# Patient Record
Sex: MALE | Race: WHITE | NOT HISPANIC OR LATINO | Employment: FULL TIME | URBAN - METROPOLITAN AREA
[De-identification: names, ages, dates, MRNs, and addresses within clinical notes are randomized per-mention and may not be internally consistent; named-entity substitution may affect disease eponyms.]

---

## 2020-10-21 ENCOUNTER — ANCILLARY PROCEDURE (OUTPATIENT)
Dept: NEUROLOGY | Facility: CLINIC | Age: 30
End: 2020-10-21
Attending: PSYCHIATRY & NEUROLOGY
Payer: COMMERCIAL

## 2020-10-21 VITALS
DIASTOLIC BLOOD PRESSURE: 82 MMHG | SYSTOLIC BLOOD PRESSURE: 141 MMHG | WEIGHT: 257.8 LBS | TEMPERATURE: 97.7 F | HEART RATE: 81 BPM | HEIGHT: 73 IN | BODY MASS INDEX: 34.17 KG/M2

## 2020-10-21 DIAGNOSIS — R56.9 SEIZURE (H): ICD-10-CM

## 2020-10-21 DIAGNOSIS — G40.209 COMPLEX PARTIAL SEIZURE EVOLVING TO GENERALIZED SEIZURE (H): Primary | ICD-10-CM

## 2020-10-21 RX ORDER — LAMOTRIGINE 100 MG/1
TABLET ORAL
COMMUNITY
Start: 2010-03-01 | End: 2020-10-21

## 2020-10-21 RX ORDER — FAMOTIDINE 10 MG
TABLET ORAL PRN
COMMUNITY
Start: 2018-06-01

## 2020-10-21 RX ORDER — FINASTERIDE 1 MG/1
TABLET, FILM COATED ORAL
COMMUNITY
Start: 2017-01-01

## 2020-10-21 RX ORDER — LAMOTRIGINE 100 MG/1
200 TABLET ORAL 2 TIMES DAILY
Qty: 360 TABLET | Refills: 3 | Status: SHIPPED | OUTPATIENT
Start: 2020-10-21 | End: 2021-01-12

## 2020-10-21 ASSESSMENT — MIFFLIN-ST. JEOR: SCORE: 2182.21

## 2020-10-21 NOTE — LETTER
10/21/2020       RE: Donnell Staples  : 1990   MRN: [id number]      Dear Colleague,    Thank you for referring your patient, Donnell Staples, to the [facility] EPILEPSY CARE at [facility]. Please see a copy of my visit note below.      [facility]  Epilepsy Clinic:  NEW PATIENT EVALUATION         Service Date: 10/21/2020    HISTORY:  Mr. 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  He has recently moved from [place], where was followed at [place] by Dr. Fer Butcher (who retired) and later by Dr. Kincaid for epilepsy care.     Ictal semiology-history:   He had a single grand mal seizure at clinical epilepsy onset.  His seizures started in  when in college. He was under a lot of stress and had poor sleep. While he was falling asleep he had a seizure. His brother found him unresponsive and jerking, with his bedding a mess and he was taken to the ER.    His seizures are characterized by right pinky shaking which he is able to feel before loss of consciousness. Then the shaking spreads through his whole body. He typically is lying in bed because they happen while he is trying to fall asleep. While he is just about to fall asleep. He had one while at his parents  house while standing and working at a computer. He has been tired and stressed prior. He became cyanotic. EMS was called. Seizures typically last 2-3 minutes (as an estimate.) His most recent seizure was on 02/10/2010; he has had a total of 10-12 seizures of this type. Many are unwitnessed while in bed.     He denies any aura symptoms like tastes, sounds, vision changes, smells. No association with alcohol. He reports the seizures will happen after a day when he has been able to sleep in a lot.     The patient does not recognize exacerbating or remitting  factors with regard to seizure frequency.      Epilepsy-seizure predispositions:   The patient has no family history of epilepsy or seizures.    He has no history of gestational or  injury, febrile convulsions, developmental delay, stroke, meningitis, encephalitis, significant head injury, or other epileptic predispositions than the report cerebral malformation.      Laboratory evaluations:   EEG on 2009 revealed occasional L frontotemporal slow activity with  occasional L temporal sharps.     Reports last MRI , has small area of cortical malformation, we reviewed this on his phone      Epilepsy therapeutics:   Keppra made him feel sleepy and mentally foggy.   Zonegran possibly had mild side effects, but mainly didn't work well for seizure control.  Lamotrigine has been well tolerated and effective.  He has been maintained on lamotrigine 200 mg BID since . He switched to generic  due to insurance coverage, with no breakthrough seizures and no side effects.  He had a lamotrigine level of7.5mcg (2.0-20.0mcg) on 01/10/2019.      PAST MEDICAL-SURGICAL HISTORY:    1. Lesional partial epilepsy.  2. History of left hemispheric malformation.  3. Status post Meniscus surgery.    FAMILY HISTORY:  There is no family history of seizures or epilepsy, or of other neurological conditions.      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  He lives with his wife in [place], [place].  He drinks alcohol rarely, denies tobacco use, denies recreational drugs.     REVIEW OF SYSTEMS:  The patient denied history of attention and memory disturbance, difficulties with comprehension and expression, difficulty in solving problems, weakness, tremors or other abnormal involuntary movements, numbness or tingling, incoordination, falling or difficulty in walking, urinary or fecal incontinence, headache and other pain, except as described above.  The patient denied any  "history of severe depression or suicidal ideation, severe anxiety, panic attacks, hallucinations, delusions, psychosis, substance abuse, or psychiatric hospitalization.  He denied rashes and easy bruising.  The remainder of a 14-system symptom review was negative.    MEDICATIONS:    Lamotrigine 200 mg b.i.d., and other medications as per the electronic medical record.     PHYSICAL EXAMINATION:    BP (!) 141/82 (BP Location: Left arm, Patient Position: Sitting, Cuff Size: Adult Large)   Pulse 81   Temp 97.7  F (36.5  C) (Temporal)   Ht 6' 1.25\" (186.1 cm)   Wt 257 lb 12.8 oz (116.9 kg)   BMI 33.78 kg/m    General: Adult, in NAD, cooperative  HEENT: NC/AT, no icterus, op pink and moist  Cardiac: RRR no M  Chest: CTAB no w/c/r  Skin: No rash or lesion.   Psych: normal mood and affect  Neuro:  Mental status: Awake, alert, attentive, oriented. Speech is fluent, comprehension and repetition intact. No dysarthria.  Cranial nerves: Eyes conjugate, EOMI, face symmetric, shoulder shrug strong, palate rise symmetric, tongue/uvula midline, hearing intact to conversation.  Motor: Tone normal. 5/5 strength in all 4 extremities. No atrophy or twitches. No pronator drift. Finger tapping symmetric.   Coordination: FNF no dysmetria, mildly reduced coordination on finger taping on the right compared to left  Gait: Normal width, stride length, turn, and arm swing. Station normal. Tandem walk intact.    IMPRESSION:    Donnell Staples is a 30 year old male who presents right simple partial motor seizures with secondary generalization due to left sided cortical abnormality, likely congenital. He is completely seizure free for 10 years on lamotrigine 200 mg BID. He prefers the generic by company named [employer] (takes 100 mg tabs). His seizures primarily happen while falling asleep and are precipitated by stress and sleep deprivation. We would like to repeat an MRI with 3T to make sure this cortical abnormality is stable and better assess " what it could be. It does not appear as deep or as wide spread as classic schizencephaly. EEG today is improved from report with no sharps and possible slow wave activity which could be related to medication. Lamotrigine level 7.5 1/2019.     We spent a considerable time reviewing the nature of malformations of cortical development, and reasons to re-evaluate this diagnosis to offer better prognostication and to exclude other types of lesions.  He did want to proceed with this higher resolution MRI study.    We also reviewed complications of hypertension and of obesity, and reasons that he should establish care with a primary care provider who will track his BP and weight, with recommendations on antihypertensive therapy as indicated and on weight loss.  He agreed with this.    He has not had a seizure with impaired awareness for many years, and has been driving under [place] regulations.  We specifically reviewed [place] regulations on seizures and driving with the patient.  He appeared to clearly understand that he is prohibited from operating a motor vehicle within 3 months following any seizure or other episode with sudden unconsciousness or inability to sit up, and that he is required to report any future such seizure to the [facility] within 30 days after the event.      PLAN:    1) Continue the current dose of lamotrigine  2) Schedule brain 3T [place] MRI.  3) Return visit in about 3 months.   Patient was seen and discussed with Dr. Negrete who agrees with this assessment and plan.   Tricia Hernandez DO  Neurology PGY-3      Report Prepared By: Tricia Heranndez DO, Neurology Resident   I agree with the findings and plan of care as documented.    I discussed our epilepsy diagnostic impressions and therapeutic recommendations with the patient. He was agreeable to this plan.    I spent 55 minutes in this patient care, more than 50% of which consisted of counseling and coordinating care.      Kevan Negrete M.D.    Professor of Neurology         Again, thank you for allowing me to participate in the care of your patient.      Sincerely,    Kevan Negrete MD

## 2020-10-21 NOTE — LETTER
Date:November 4, 2020      Patient was self referred, no letter generated. Do not send.        [facility] Health Information

## 2020-10-21 NOTE — PROGRESS NOTES
[facility]  Epilepsy Clinic:  NEW PATIENT EVALUATION         Service Date: 10/21/2020    HISTORY:  Mr. 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  He has recently moved from [place], where was followed at [place] by Dr. eFr Butcher (who retired) and later by Dr. Kincaid for epilepsy care.     Ictal semiology-history:   He had a single grand mal seizure at clinical epilepsy onset.  His seizures started in  when in college. He was under a lot of stress and had poor sleep. While he was falling asleep he had a seizure. His brother found him unresponsive and jerking, with his bedding a mess and he was taken to the ER.    His seizures are characterized by right pinky shaking which he is able to feel before loss of consciousness. Then the shaking spreads through his whole body. He typically is lying in bed because they happen while he is trying to fall asleep. While he is just about to fall asleep. He had one while at his parents  house while standing and working at a computer. He has been tired and stressed prior. He became cyanotic. EMS was called. Seizures typically last 2-3 minutes (as an estimate.) His most recent seizure was on 02/10/2010; he has had a total of 10-12 seizures of this type. Many are unwitnessed while in bed.     He denies any aura symptoms like tastes, sounds, vision changes, smells. No association with alcohol. He reports the seizures will happen after a day when he has been able to sleep in a lot.     The patient does not recognize exacerbating or remitting factors with regard to seizure frequency.      Epilepsy-seizure predispositions:   The patient has no family history of epilepsy or seizures.    He has no history of gestational or  injury, febrile convulsions, developmental delay, stroke, meningitis,  encephalitis, significant head injury, or other epileptic predispositions than the report cerebral malformation.      Laboratory evaluations:   EEG on 5/6/2009 revealed occasional L frontotemporal slow activity with  occasional L temporal sharps.     Reports last MRI 2010, has small area of cortical malformation, we reviewed this on his phone      Epilepsy therapeutics:   Keppra made him feel sleepy and mentally foggy.   Zonegran possibly had mild side effects, but mainly didn't work well for seizure control.  Lamotrigine has been well tolerated and effective.  He has been maintained on lamotrigine 200 mg BID since 2010. He switched to generic 2019 due to insurance coverage, with no breakthrough seizures and no side effects.  He had a lamotrigine level of7.5mcg (2.0-20.0mcg) on 01/10/2019.      PAST MEDICAL-SURGICAL HISTORY:    1. Lesional partial epilepsy.  2. History of left hemispheric malformation.  3. Status post Meniscus surgery.    FAMILY HISTORY:  There is no family history of seizures or epilepsy, or of other neurological conditions.      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  He lives with his wife in [place], [place].  He drinks alcohol rarely, denies tobacco use, denies recreational drugs.     REVIEW OF SYSTEMS:  The patient denied history of attention and memory disturbance, difficulties with comprehension and expression, difficulty in solving problems, weakness, tremors or other abnormal involuntary movements, numbness or tingling, incoordination, falling or difficulty in walking, urinary or fecal incontinence, headache and other pain, except as described above.  The patient denied any history of severe depression or suicidal ideation, severe anxiety, panic attacks, hallucinations, delusions, psychosis, substance abuse, or psychiatric hospitalization.  He denied rashes and easy bruising.  The remainder of a 14-system symptom review was  "negative.    MEDICATIONS:    Lamotrigine 200 mg b.i.d., and other medications as per the electronic medical record.     PHYSICAL EXAMINATION:    BP (!) 141/82 (BP Location: Left arm, Patient Position: Sitting, Cuff Size: Adult Large)   Pulse 81   Temp 97.7  F (36.5  C) (Temporal)   Ht 6' 1.25\" (186.1 cm)   Wt 257 lb 12.8 oz (116.9 kg)   BMI 33.78 kg/m    General: Adult, in NAD, cooperative  HEENT: NC/AT, no icterus, op pink and moist  Cardiac: RRR no M  Chest: CTAB no w/c/r  Skin: No rash or lesion.   Psych: normal mood and affect  Neuro:  Mental status: Awake, alert, attentive, oriented. Speech is fluent, comprehension and repetition intact. No dysarthria.  Cranial nerves: Eyes conjugate, EOMI, face symmetric, shoulder shrug strong, palate rise symmetric, tongue/uvula midline, hearing intact to conversation.  Motor: Tone normal. 5/5 strength in all 4 extremities. No atrophy or twitches. No pronator drift. Finger tapping symmetric.   Coordination: FNF no dysmetria, mildly reduced coordination on finger taping on the right compared to left  Gait: Normal width, stride length, turn, and arm swing. Station normal. Tandem walk intact.    IMPRESSION:    Donnell Staples is a 30 year old male who presents right simple partial motor seizures with secondary generalization due to left sided cortical abnormality, likely congenital. He is completely seizure free for 10 years on lamotrigine 200 mg BID. He prefers the generic by company named [employer] (takes 100 mg tabs). His seizures primarily happen while falling asleep and are precipitated by stress and sleep deprivation. We would like to repeat an MRI with 3T to make sure this cortical abnormality is stable and better assess what it could be. It does not appear as deep or as wide spread as classic schizencephaly. EEG today is improved from report with no sharps and possible slow wave activity which could be related to medication. Lamotrigine level 7.5 1/2019.     We spent a " considerable time reviewing the nature of malformations of cortical development, and reasons to re-evaluate this diagnosis to offer better prognostication and to exclude other types of lesions.  He did want to proceed with this higher resolution MRI study.    We also reviewed complications of hypertension and of obesity, and reasons that he should establish care with a primary care provider who will track his BP and weight, with recommendations on antihypertensive therapy as indicated and on weight loss.  He agreed with this.    He has not had a seizure with impaired awareness for many years, and has been driving under [place] regulations.  We specifically reviewed [place] regulations on seizures and driving with the patient.  He appeared to clearly understand that he is prohibited from operating a motor vehicle within 3 months following any seizure or other episode with sudden unconsciousness or inability to sit up, and that he is required to report any future such seizure to the [facility] within 30 days after the event.      PLAN:    1) Continue the current dose of lamotrigine  2) Schedule brain 3T [place] MRI.  3) Return visit in about 3 months.   Patient was seen and discussed with Dr. Negrete who agrees with this assessment and plan.   Tricia Hernandez DO  Neurology PGY-3      Report Prepared By: Tricia Hernandez DO, Neurology Resident   I agree with the findings and plan of care as documented.    I discussed our epilepsy diagnostic impressions and therapeutic recommendations with the patient. He was agreeable to this plan.    I spent 55 minutes in this patient care, more than 50% of which consisted of counseling and coordinating care.      Kevan Negrete M.D.   Professor of Neurology

## 2020-11-18 ENCOUNTER — ANCILLARY PROCEDURE (OUTPATIENT)
Dept: MRI IMAGING | Facility: CLINIC | Age: 30
End: 2020-11-18
Attending: PSYCHIATRY & NEUROLOGY
Payer: COMMERCIAL

## 2020-11-18 DIAGNOSIS — G40.209 COMPLEX PARTIAL SEIZURE EVOLVING TO GENERALIZED SEIZURE (H): ICD-10-CM

## 2020-11-18 RX ORDER — GADOBUTROL 604.72 MG/ML
7.5 INJECTION INTRAVENOUS ONCE
Status: COMPLETED | OUTPATIENT
Start: 2020-11-18 | End: 2020-11-18

## 2020-11-18 RX ADMIN — GADOBUTROL 4.2 ML: 604.72 INJECTION INTRAVENOUS at 09:30

## 2020-11-18 RX ADMIN — GADOBUTROL 7.5 ML: 604.72 INJECTION INTRAVENOUS at 09:30

## 2021-01-04 ENCOUNTER — HEALTH MAINTENANCE LETTER (OUTPATIENT)
Age: 31
End: 2021-01-04

## 2021-01-12 ENCOUNTER — VIRTUAL VISIT (OUTPATIENT)
Dept: NEUROLOGY | Facility: CLINIC | Age: 31
End: 2021-01-12
Payer: COMMERCIAL

## 2021-01-12 DIAGNOSIS — G40.209 COMPLEX PARTIAL SEIZURE EVOLVING TO GENERALIZED SEIZURE (H): Primary | ICD-10-CM

## 2021-01-12 PROCEDURE — 99215 OFFICE O/P EST HI 40 MIN: CPT | Mod: 95 | Performed by: PSYCHIATRY & NEUROLOGY

## 2021-01-12 RX ORDER — LAMOTRIGINE 100 MG/1
200 TABLET ORAL 2 TIMES DAILY
Qty: 360 TABLET | Refills: 3 | Status: SHIPPED | OUTPATIENT
Start: 2021-01-12 | End: 2021-02-05

## 2021-01-12 NOTE — LETTER
"1/12/2021       RE: Donnell Staples  [address]  [facility] [address]     Dear Colleague,    Thank you for referring your patient, Donnell Staples, to the [facility] NEUROLOGY CLINIC [place] at [facility]. Please see a copy of my visit note below.      Donnell Staples is a 30 year old who is being evaluated via a billable video visit.       The patient has been notified of following:      \"This video visit will be conducted via a call between you and your physician/provider. We have found that certain health care needs can be provided without the need for an in-person physical exam.  This service lets us provide the care you need with a video conversation.  If a prescription is necessary we can send it directly to your pharmacy.  If lab work is needed we can place an order for that and you can then stop by our lab to have the test done at a later time.     Video visits are billed at different rates depending on your insurance coverage.  Please reach out to your insurance provider with any questions.     If during the course of the call the physician/provider feels a video visit is not appropriate, you will not be charged for this service.\"     Patient has given verbal consent for Video visit? YES  How would you like to obtain your AVS?  My Chart   Will anyone else be joining your video visit? NO       EPILEPSY CLINIC VIDEO VISIT NOTE  The scheduled clinic visit was changed to a video visit to reduce the risk of COVID-19 transmission.           Service Date: 01/12/2021    HISTORY: I spoke with Mr. Donnell Staples.  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       The patient denied having recent fever or cough, and exposure to anyone thought to have COVID-19.     Following the most recent visit to this clinic on 10/21/2020, the patient reportedly has had no seizures of any type.  His most recent seizure was a " secondarily generalized tonic-clonic seizure on 02/10/2010.      He denied having any lamotrigine adverse effects.     MEDICATIONS:  Lamotrigine 200 mg b.i.d., and other medications as per the electronic medical record.     VIDEO OBSERVATIONS:   The patient spoke with normal articulation, fluency and syntax, with normal comprehension of questions.    On command, the patient moved the direction of gaze into all 4 quadrants conjugately and without nystagmus.   Facial movements were normal.    Tongue protruded on the midline.    The patient did not display any resting tremors or action tremors of the outstretched arms.  Limb movements were normal.      LABORATORY DATA:   AED levels on 01/10/2019:   Lamotrigine: 7.5 mcg/ml.     IMPRESSION:   Seizures are well controlled, with no adverse effects of lamotrigine.    He is quite concerned with arriving at the correct diagnosis of the focal MRI lesions.  I reviewed the brain MRI findings with him in detail, and related them to the location of the ictal onsets zone in relation to ictal manifestations.  He was satisfied with this detailed explanation. He wants to review the actual MR images with me at the next visit.  We will request an in-person visit in about 6 months for this purpose.      He has not had a seizure with impaired awareness for many years, and has been driving under [place] regulations.  We specifically reviewed [place] regulations on seizures and driving with the patient.  He appeared to clearly understand that he is prohibited from operating a motor vehicle within 3 months following any seizure or other episode with sudden unconsciousness or inability to sit up, and that he is required to report any future such seizure to the [facility] within 30 days after the event.      PLAN:   1)  Continue lamotrigine at the current dose.   2)  Return in 6 months for in-person clinic visit.    Video Conference via [employer].   Video Start Time: 11:47 a.m.   Video Stop  Time: 12:16 p.m.   Provider location: [facility]   Reported Patient Location: Home     I personally spent 42 minutes in this patient care on the day of this visit, including time in direct contact with the patient of which more than 50% consisted of counseling and coordinating care, and time in other necessary patient care activities without direct patient contact including medical record and imaging review.      Kevan Negrete M.D., Professor of Neurology          Again, thank you for allowing me to participate in the care of your patient.      Sincerely,    Kevan Negrete MD

## 2021-01-12 NOTE — LETTER
Date:March 18, 2021      Patient was self referred, no letter generated. Do not send.        [facility] Health Information

## 2021-01-13 NOTE — PROGRESS NOTES
"  Donnell Staples is a 30 year old who is being evaluated via a billable video visit.       The patient has been notified of following:      \"This video visit will be conducted via a call between you and your physician/provider. We have found that certain health care needs can be provided without the need for an in-person physical exam.  This service lets us provide the care you need with a video conversation.  If a prescription is necessary we can send it directly to your pharmacy.  If lab work is needed we can place an order for that and you can then stop by our lab to have the test done at a later time.     Video visits are billed at different rates depending on your insurance coverage.  Please reach out to your insurance provider with any questions.     If during the course of the call the physician/provider feels a video visit is not appropriate, you will not be charged for this service.\"     Patient has given verbal consent for Video visit? YES  How would you like to obtain your AVS?  My Chart   Will anyone else be joining your video visit? NO       EPILEPSY CLINIC VIDEO VISIT NOTE  The scheduled clinic visit was changed to a video visit to reduce the risk of COVID-19 transmission.           Service Date: 01/12/2021    HISTORY: I spoke with Mr. Donnell Staples.  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       The patient denied having recent fever or cough, and exposure to anyone thought to have COVID-19.     Following the most recent visit to this clinic on 10/21/2020, the patient reportedly has had no seizures of any type.  His most recent seizure was a secondarily generalized tonic-clonic seizure on 02/10/2010.      He denied having any lamotrigine adverse effects.     MEDICATIONS:  Lamotrigine 200 mg b.i.d., and other medications as per the electronic medical record.     VIDEO OBSERVATIONS:   The patient spoke with normal articulation, fluency and " syntax, with normal comprehension of questions.    On command, the patient moved the direction of gaze into all 4 quadrants conjugately and without nystagmus.   Facial movements were normal.    Tongue protruded on the midline.    The patient did not display any resting tremors or action tremors of the outstretched arms.  Limb movements were normal.      LABORATORY DATA:   AED levels on 01/10/2019:   Lamotrigine: 7.5 mcg/ml.     IMPRESSION:   Seizures are well controlled, with no adverse effects of lamotrigine.    He is quite concerned with arriving at the correct diagnosis of the focal MRI lesions.  I reviewed the brain MRI findings with him in detail, and related them to the location of the ictal onsets zone in relation to ictal manifestations.  He was satisfied with this detailed explanation. He wants to review the actual MR images with me at the next visit.  We will request an in-person visit in about 6 months for this purpose.      He has not had a seizure with impaired awareness for many years, and has been driving under [place] regulations.  We specifically reviewed [place] regulations on seizures and driving with the patient.  He appeared to clearly understand that he is prohibited from operating a motor vehicle within 3 months following any seizure or other episode with sudden unconsciousness or inability to sit up, and that he is required to report any future such seizure to the [facility] within 30 days after the event.      PLAN:   1)  Continue lamotrigine at the current dose.   2)  Return in 6 months for in-person clinic visit.    Video Conference via [employer].   Video Start Time: 11:47 a.m.   Video Stop Time: 12:16 p.m.   Provider location: [facility]   Reported Patient Location: Home     I personally spent 42 minutes in this patient care on the day of this visit, including time in direct contact with the patient of which more than 50% consisted of counseling and coordinating care, and time in  other necessary patient care activities without direct patient contact including medical record and imaging review.      Kevan Negrete M.D., Professor of Neurology

## 2021-02-04 ENCOUNTER — MYC MEDICAL ADVICE (OUTPATIENT)
Dept: NEUROLOGY | Facility: CLINIC | Age: 31
End: 2021-02-04

## 2021-02-04 DIAGNOSIS — G40.209 COMPLEX PARTIAL SEIZURE EVOLVING TO GENERALIZED SEIZURE (H): ICD-10-CM

## 2021-02-05 RX ORDER — LAMOTRIGINE 100 MG/1
200 TABLET ORAL 2 TIMES DAILY
Qty: 360 TABLET | Refills: 2 | Status: SHIPPED | OUTPATIENT
Start: 2021-02-05 | End: 2021-06-22

## 2021-02-05 NOTE — TELEPHONE ENCOUNTER
Transfer of pharmacy from [facility] to [facility] for insurance reason.  STOVALL prescription re-sent as requested.  Note sent to patient

## 2021-04-27 ENCOUNTER — TELEPHONE (OUTPATIENT)
Dept: NEUROLOGY | Facility: CLINIC | Age: 31
End: 2021-04-27

## 2021-04-29 ENCOUNTER — MYC MEDICAL ADVICE (OUTPATIENT)
Dept: NEUROLOGY | Facility: CLINIC | Age: 31
End: 2021-04-29

## 2021-06-22 ENCOUNTER — APPOINTMENT (OUTPATIENT)
Dept: LAB | Facility: CLINIC | Age: 31
End: 2021-06-22
Payer: COMMERCIAL

## 2021-06-22 ENCOUNTER — OFFICE VISIT (OUTPATIENT)
Dept: NEUROLOGY | Facility: CLINIC | Age: 31
End: 2021-06-22
Payer: COMMERCIAL

## 2021-06-22 VITALS
BODY MASS INDEX: 35.78 KG/M2 | HEART RATE: 59 BPM | SYSTOLIC BLOOD PRESSURE: 140 MMHG | DIASTOLIC BLOOD PRESSURE: 90 MMHG | HEIGHT: 73 IN | OXYGEN SATURATION: 98 % | RESPIRATION RATE: 16 BRPM | WEIGHT: 270 LBS

## 2021-06-22 DIAGNOSIS — G40.209 COMPLEX PARTIAL SEIZURE EVOLVING TO GENERALIZED SEIZURE (H): ICD-10-CM

## 2021-06-22 PROCEDURE — 99000 SPECIMEN HANDLING OFFICE-LAB: CPT | Performed by: PATHOLOGY

## 2021-06-22 PROCEDURE — 80175 DRUG SCREEN QUAN LAMOTRIGINE: CPT | Mod: 90 | Performed by: PATHOLOGY

## 2021-06-22 PROCEDURE — 99213 OFFICE O/P EST LOW 20 MIN: CPT | Performed by: PSYCHIATRY & NEUROLOGY

## 2021-06-22 RX ORDER — LAMOTRIGINE 100 MG/1
200 TABLET ORAL 2 TIMES DAILY
Qty: 360 TABLET | Refills: 3 | Status: SHIPPED | OUTPATIENT
Start: 2021-06-22 | End: 2022-07-07

## 2021-06-22 ASSESSMENT — PAIN SCALES - GENERAL: PAINLEVEL: NO PAIN (0)

## 2021-06-22 ASSESSMENT — MIFFLIN-ST. JEOR: SCORE: 2232.55

## 2021-06-22 NOTE — LETTER
6/22/2021       RE: Donnell Staples  [address]  [facility] [address]     Dear Colleague,    Thank you for referring your patient, Donnell Staples, to the [facility] [place] at [facility]. Please see a copy of my visit note below.      [facility]:  RETURN VISIT          Service Date: 06/22/2021    HISTORY:  Mr. Donnell Staples is a 31-year-old man with lesional partial epilepsy.  He came alone to the visit today.     He denied having any seizures or adverse medication effects, following the most recent visit to this clinic on 01/12/2021.      Ictal semiology-history:   He had a single grand mal seizure at clinical epilepsy onset.  His seizures started in 2009 when in college. He was under a lot of stress and had poor sleep. While he was falling asleep he had a seizure. His brother found him unresponsive and jerking, with his bedding a mess and he was taken to the ER.    His seizures are characterized by right pinky shaking which he is able to feel before loss of consciousness. Then the shaking spreads through his whole body. He typically is lying in bed because they happen while he is trying to fall asleep. While he is just about to fall asleep. He had one while at his parents  house while standing and working at a computer. He has been tired and stressed prior. He became cyanotic. EMS was called. Seizures typically last 2-3 minutes (as an estimate.) His most recent seizure was on 02/10/2010; he has had a total of 10-12 seizures of this type. Many are unwitnessed while in bed.     He denies any aura symptoms like tastes, sounds, vision changes, smells. No association with alcohol. He reports the seizures will happen after a day when he has been able to sleep in a lot.     The patient does not recognize exacerbating or remitting factors with regard to seizure frequency.      Epilepsy-seizure predispositions:   The  patient has no family history of epilepsy or seizures.    He has no history of gestational or  injury, febrile convulsions, developmental delay, stroke, meningitis, encephalitis, significant head injury, or other epileptic predispositions than the report cerebral malformation.      Laboratory evaluations:   EEG on 2009 revealed occasional L frontotemporal slow activity with  occasional L temporal sharps.     Reports last MRI , has small area of cortical malformation, we reviewed this on his phone      At [facility] on 2020, a brain 3T Tatum MRI was reported to show  cortical thinning of the posterior sylvian fissure and cingulate sulcus and adjacent to white matter volume loss and associated prominent subdural space , presumably on the left only.     Epilepsy therapeutics:   Keppra made him feel sleepy and mentally foggy.   Zonegran possibly had mild side effects, but mainly didn't work well for seizure control.  Lamotrigine has been well tolerated and effective.  He has been maintained on lamotrigine since . He switched to generic  due to insurance coverage, with no breakthrough seizures and no side effects.  He had a lamotrigine level of 7.5mcg (2.0-20.0mcg) on 01/10/2019.      PAST MEDICAL-SURGICAL HISTORY:    1. Lesional partial epilepsy.  2. Left perisylvian malformation.  3. Status post meniscus surgery.    FAMILY HISTORY:  There is no family history of seizures or epilepsy, or of other neurological conditions.      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  He lives with his wife in [place], [place].  He drinks alcohol rarely, denies tobacco use, denies recreational drugs.     REVIEW OF SYSTEMS:  The patient denied history of attention and memory disturbance, difficulties with comprehension and expression, difficulty in solving problems, weakness, tremors or other abnormal involuntary movements, numbness or tingling,  incoordination, falling or difficulty in walking, urinary or fecal incontinence, headache and other pain, except as described above.  The patient denied any history of severe depression or suicidal ideation, severe anxiety, panic attacks, hallucinations, delusions, psychosis, substance abuse, or psychiatric hospitalization.  He denied rashes and easy bruising.  The remainder of a 14-system symptom review was negative.    MEDICATIONS:    Lamotrigine 200 mg b.i.d., and other medications as per the electronic medical record.     PHYSICAL EXAMINATION:    Neuro:  Mental status: Awake, alert, attentive, oriented. Speech is fluent, comprehension and repetition intact. No dysarthria.  Cranial nerves: Eyes conjugate, EOMI, face symmetric, shoulder shrug strong, palate rise symmetric, tongue/uvula midline, hearing intact to conversation.  Motor: Tone normal. 5/5 strength in all 4 extremities. No atrophy or twitches. No pronator drift. Finger tapping symmetric.   Coordination: FNF no dysmetria, mildly reduced coordination on finger taping on the right compared to left  Gait: Normal width, stride length, turn, and arm swing. Station normal. Tandem walk intact.    IMPRESSION:    The patient has been seizure-free for many years on lamotrigine.  He has no evidcne of adverse effects.      We reviewed the 3T MRI that was performed on 11/18/2020, viewing the images together.  The cortical deformity is likely at the site of ictal onsets, and it not appear to have features of a neoplasm, according to the radiologist.      We again reviewed reasons that he should establish care with a primary care provider, for recommendations on antihypertensive therapy as indicated and on weight loss.  He agreed with this.    He has not had a seizure with impaired awareness for many years, and has been driving under [place] regulations.  We specifically reviewed [place] regulations on seizures and driving with the patient.  He appeared to clearly  understand that he is prohibited from operating a motor vehicle within 3 months following any seizure or other episode with sudden unconsciousness or inability to sit up, and that he is required to report any future such seizure to the DPS within 30 days after the event.      PLAN:    1) Continue the current dose of lamotrigine  2) Return visit in about 10 months.   I personally spent 22 minutes in this patient care on the day of this visit, including time in direct contact with the patient of which more than 50% consisted of counseling and coordinating care, and time in other necessary patient care activities without direct patient contact including medical record and imaging review.      Kevan Negrete M.D.   Professor of Neurology         Again, thank you for allowing me to participate in the care of your patient.      Sincerely,    Kevan Negrete MD

## 2021-06-23 LAB — LAMOTRIGINE SERPL-MCNC: 6.2 UG/ML (ref 2.5–15)

## 2021-07-01 NOTE — PROGRESS NOTES
[facility]:  RETURN VISIT          Service Date: 2021    HISTORY:  Mr. Donnell Staples is a 31-year-old man with lesional partial epilepsy.  He came alone to the visit today.     He denied having any seizures or adverse medication effects, following the most recent visit to this clinic on 2021.      Ictal semiology-history:   He had a single grand mal seizure at clinical epilepsy onset.  His seizures started in  when in college. He was under a lot of stress and had poor sleep. While he was falling asleep he had a seizure. His brother found him unresponsive and jerking, with his bedding a mess and he was taken to the ER.    His seizures are characterized by right pinky shaking which he is able to feel before loss of consciousness. Then the shaking spreads through his whole body. He typically is lying in bed because they happen while he is trying to fall asleep. While he is just about to fall asleep. He had one while at his parents  house while standing and working at a computer. He has been tired and stressed prior. He became cyanotic. EMS was called. Seizures typically last 2-3 minutes (as an estimate.) His most recent seizure was on 02/10/2010; he has had a total of 10-12 seizures of this type. Many are unwitnessed while in bed.     He denies any aura symptoms like tastes, sounds, vision changes, smells. No association with alcohol. He reports the seizures will happen after a day when he has been able to sleep in a lot.     The patient does not recognize exacerbating or remitting factors with regard to seizure frequency.      Epilepsy-seizure predispositions:   The patient has no family history of epilepsy or seizures.    He has no history of gestational or  injury, febrile convulsions, developmental delay, stroke, meningitis, encephalitis, significant head injury, or other epileptic predispositions than the report cerebral malformation.      Laboratory  evaluations:   EEG on 5/6/2009 revealed occasional L frontotemporal slow activity with  occasional L temporal sharps.     Reports last MRI 2010, has small area of cortical malformation, we reviewed this on his phone      At [facility] on 11/18/2020, a brain 3T Tatum MRI was reported to show  cortical thinning of the posterior sylvian fissure and cingulate sulcus and adjacent to white matter volume loss and associated prominent subdural space , presumably on the left only.     Epilepsy therapeutics:   Keppra made him feel sleepy and mentally foggy.   Zonegran possibly had mild side effects, but mainly didn't work well for seizure control.  Lamotrigine has been well tolerated and effective.  He has been maintained on lamotrigine since 2010. He switched to generic 2019 due to insurance coverage, with no breakthrough seizures and no side effects.  He had a lamotrigine level of 7.5mcg (2.0-20.0mcg) on 01/10/2019.      PAST MEDICAL-SURGICAL HISTORY:    1. Lesional partial epilepsy.  2. Left perisylvian malformation.  3. Status post meniscus surgery.    FAMILY HISTORY:  There is no family history of seizures or epilepsy, or of other neurological conditions.      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  He lives with his wife in [place], [place].  He drinks alcohol rarely, denies tobacco use, denies recreational drugs.     REVIEW OF SYSTEMS:  The patient denied history of attention and memory disturbance, difficulties with comprehension and expression, difficulty in solving problems, weakness, tremors or other abnormal involuntary movements, numbness or tingling, incoordination, falling or difficulty in walking, urinary or fecal incontinence, headache and other pain, except as described above.  The patient denied any history of severe depression or suicidal ideation, severe anxiety, panic attacks, hallucinations, delusions, psychosis, substance abuse, or psychiatric  hospitalization.  He denied rashes and easy bruising.  The remainder of a 14-system symptom review was negative.    MEDICATIONS:    Lamotrigine 200 mg b.i.d., and other medications as per the electronic medical record.     PHYSICAL EXAMINATION:    Neuro:  Mental status: Awake, alert, attentive, oriented. Speech is fluent, comprehension and repetition intact. No dysarthria.  Cranial nerves: Eyes conjugate, EOMI, face symmetric, shoulder shrug strong, palate rise symmetric, tongue/uvula midline, hearing intact to conversation.  Motor: Tone normal. 5/5 strength in all 4 extremities. No atrophy or twitches. No pronator drift. Finger tapping symmetric.   Coordination: FNF no dysmetria, mildly reduced coordination on finger taping on the right compared to left  Gait: Normal width, stride length, turn, and arm swing. Station normal. Tandem walk intact.    IMPRESSION:    The patient has been seizure-free for many years on lamotrigine.  He has no evidcne of adverse effects.      We reviewed the 3T MRI that was performed on 11/18/2020, viewing the images together.  The cortical deformity is likely at the site of ictal onsets, and it not appear to have features of a neoplasm, according to the radiologist.      We again reviewed reasons that he should establish care with a primary care provider, for recommendations on antihypertensive therapy as indicated and on weight loss.  He agreed with this.    He has not had a seizure with impaired awareness for many years, and has been driving under [place] regulations.  We specifically reviewed [place] regulations on seizures and driving with the patient.  He appeared to clearly understand that he is prohibited from operating a motor vehicle within 3 months following any seizure or other episode with sudden unconsciousness or inability to sit up, and that he is required to report any future such seizure to the [facility] within 30 days after the event.      PLAN:    1) Continue the current  dose of lamotrigine  2) Return visit in about 10 months.   I personally spent 22 minutes in this patient care on the day of this visit, including time in direct contact with the patient of which more than 50% consisted of counseling and coordinating care, and time in other necessary patient care activities without direct patient contact including medical record and imaging review.      Kevan Negrete M.D.   Professor of Neurology

## 2021-10-11 ENCOUNTER — HEALTH MAINTENANCE LETTER (OUTPATIENT)
Age: 31
End: 2021-10-11

## 2022-01-30 ENCOUNTER — HEALTH MAINTENANCE LETTER (OUTPATIENT)
Age: 32
End: 2022-01-30

## 2022-07-06 DIAGNOSIS — G40.209 COMPLEX PARTIAL SEIZURE EVOLVING TO GENERALIZED SEIZURE (H): ICD-10-CM

## 2022-07-08 RX ORDER — LAMOTRIGINE 100 MG/1
TABLET ORAL
Qty: 360 TABLET | Refills: 3 | OUTPATIENT
Start: 2022-07-08

## 2022-07-25 ENCOUNTER — OFFICE VISIT (OUTPATIENT)
Dept: NEUROLOGY | Facility: CLINIC | Age: 32
End: 2022-07-25
Payer: COMMERCIAL

## 2022-07-25 VITALS
WEIGHT: 268.2 LBS | DIASTOLIC BLOOD PRESSURE: 81 MMHG | SYSTOLIC BLOOD PRESSURE: 126 MMHG | BODY MASS INDEX: 35.14 KG/M2 | HEART RATE: 56 BPM | OXYGEN SATURATION: 97 %

## 2022-07-25 DIAGNOSIS — G40.209 COMPLEX PARTIAL SEIZURE EVOLVING TO GENERALIZED SEIZURE (H): Primary | ICD-10-CM

## 2022-07-25 PROCEDURE — 99214 OFFICE O/P EST MOD 30 MIN: CPT | Performed by: PSYCHIATRY & NEUROLOGY

## 2022-07-25 RX ORDER — LAMOTRIGINE 100 MG/1
200 TABLET ORAL 2 TIMES DAILY
Qty: 360 TABLET | Refills: 3 | Status: SHIPPED | OUTPATIENT
Start: 2022-07-25 | End: 2023-06-13

## 2022-07-25 ASSESSMENT — PAIN SCALES - GENERAL: PAINLEVEL: NO PAIN (0)

## 2022-07-25 NOTE — LETTER
Date:July 26, 2022      Provider requested that no letter be sent. Do not send.       [facility]

## 2022-07-25 NOTE — LETTER
7/25/2022       RE: Donnell Staples  [address] [facility] [address]     Dear Colleague,    Thank you for referring your patient, Donnell Staples, to the [facility] [place] [facility]. Please see a copy of my visit note below.      [facility]:  RETURN VISIT          Service Date: 07/25/2022    HISTORY:  Mr. Donnell Staples is a 32-year-old man with lesional partial epilepsy.  He came alone to the visit today.      He denied having any seizures or adverse medication effects, following the most recent visit to this clinic on 06/22/2021.       Ictal semiology-history:   He had a single grand mal seizure at clinical epilepsy onset.  His seizures started in 2009 when in college. He was under a lot of stress and had poor sleep. While he was falling asleep he had a seizure. His brother found him unresponsive and jerking, with his bedding a mess and he was taken to the ER.     His seizures are characterized by right pinky shaking which he is able to feel before loss of consciousness. Then the shaking spreads through his whole body. He typically is lying in bed because they happen while he is trying to fall asleep. While he is just about to fall asleep. He had one while at his parents  house while standing and working at a computer. He has been tired and stressed prior. He became cyanotic. EMS was called. Seizures typically last 2-3 minutes (as an estimate.) His most recent seizure was on 02/10/2010; he has had a total of 10-12 seizures of this type. Many are unwitnessed while in bed.      He denies any aura symptoms like tastes, sounds, vision changes, smells. No association with alcohol. He reports the seizures will happen after a day when he has been able to sleep in a lot.      The patient does not recognize exacerbating or remitting factors with regard to seizure frequency.      Epilepsy-seizure  predispositions:   The patient has no family history of epilepsy or seizures.    He has no history of gestational or  injury, febrile convulsions, developmental delay, stroke, meningitis, encephalitis, significant head injury, or other epileptic predispositions than the report cerebral malformation.       Laboratory evaluations:   EEG on 2009 revealed occasional L frontotemporal slow activity with  occasional L temporal sharps.      Reports last MRI , has small area of cortical malformation, we reviewed this on his phone       At [facility] on 2020, a brain 3T Tatum MRI was reported to show  cortical thinning of the posterior sylvian fissure and cingulate sulcus and adjacent to white matter volume loss and associated prominent subdural space , presumably on the left only.      Epilepsy therapeutics:   Keppra made him feel sleepy and mentally foggy.   Zonegran possibly had mild side effects, but mainly didn't work well for seizure control.  Lamotrigine has been well tolerated and effective.  He has been maintained on lamotrigine since . He switched to generic  due to insurance coverage, with no breakthrough seizures and no side effects.  He had a lamotrigine level of 7.5mcg (2.0-20.0mcg) on 01/10/2019.       PAST MEDICAL-SURGICAL HISTORY:    1. Lesional partial epilepsy.  2. Left perisylvian malformation.  3. Status post meniscus surgery.     FAMILY HISTORY:  There is no family history of seizures or epilepsy, or of other neurological conditions.       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  He lives with his wife in [place], [place].  He drinks alcohol rarely, denies tobacco use, denies recreational drugs.      REVIEW OF SYSTEMS:  The patient denied history of attention and memory disturbance, difficulties with comprehension and expression, difficulty in solving problems, weakness, tremors or other abnormal involuntary movements,  numbness or tingling, incoordination, falling or difficulty in walking, urinary or fecal incontinence, headache and other pain, except as described above.  The patient denied any history of severe depression or suicidal ideation, severe anxiety, panic attacks, hallucinations, delusions, psychosis, substance abuse, or psychiatric hospitalization.  He denied rashes and easy bruising.  The remainder of a 14-system symptom review was negative.     MEDICATIONS:  Lamotrigine 200 mg b.i.d., and other medications as per the electronic medical record.      PHYSICAL EXAMINATION:    The patient was alert and in no apparent distress.  Vital signs were as per the electronic medical record.  Skull was normocephalic and atraumatic.  Neck was supple, without signs of meningeal irritation.      On neurological examination the patient appeared alert and was fully oriented to person, place, time, and reason for visit.  Speech showed normal articulation, fluency, repetitions, naming, syntax and comprehension.  Cranial nerves III through XII were normal.  Muscle masses, tones and strengths were normal throughout.  There was no pronator drift.  Sequential fine finger movements were performed normally with each hand.  No spontaneous tremors, myoclonus, or other abnormal movements were observed.  Sensations of light touch, pin prick, vibration and proprioception were reportedly normal throughout.  The rapid alternating movements, and finger-nose-finger and heel-shin maneuvers were performed normally bilaterally.  Romberg maneuver was negative.  Regular, heel, toe, tandem and reverse tandem walking were normal.  Deep tendon reflexes were normal and symmetric throughout.  Toes were downgoing bilaterally.      IMPRESSION:    The patient remains seizure-free on lamotrigine.  He has no apparent adverse effects.       We discussed the 3T MRI that was performed on 11/18/2020.  The cortical deformity is likely at the site of ictal onsets, and it  not appear to have features of a neoplasm, according to the radiologist.       We again reviewed reasons that he should establish care with a primary care provider, for blood pressure monitoring and recommendations on weight loss.  He agreed with this.     He has not had a seizure with impaired awareness for many years, and has been driving under [place] regulations.  We specifically reviewed [place] regulations on seizures and driving with the patient.  He appeared to clearly understand that he is prohibited from operating a motor vehicle within 3 months following any seizure or other episode with sudden unconsciousness or inability to sit up, and that he is required to report any future such seizure to the [facility] within 30 days after the event.       PLAN:    1) Continue the current dose of lamotrigine  2) Return visit in about 11 months.     I spent 32 minutes in this patient care, with 20 minutes in direct patient contact, and 12 minutes in chart review and document preparation.       Kevan Negrete M.D.   Professor of Neurology        Again, thank you for allowing me to participate in the care of your patient.      Sincerely,    Kevan Negrete MD

## 2022-07-26 NOTE — PROGRESS NOTES
[facility]psy [facility]:  RETURN VISIT          Service Date: 2022    HISTORY:  Mr. Donnell Staples is a 32-year-old man with lesional partial epilepsy.  He came alone to the visit today.      He denied having any seizures or adverse medication effects, following the most recent visit to this clinic on 2021.       Ictal semiology-history:   He had a single grand mal seizure at clinical epilepsy onset.  His seizures started in  when in college. He was under a lot of stress and had poor sleep. While he was falling asleep he had a seizure. His brother found him unresponsive and jerking, with his bedding a mess and he was taken to the ER.     His seizures are characterized by right pinky shaking which he is able to feel before loss of consciousness. Then the shaking spreads through his whole body. He typically is lying in bed because they happen while he is trying to fall asleep. While he is just about to fall asleep. He had one while at his parents  house while standing and working at a computer. He has been tired and stressed prior. He became cyanotic. EMS was called. Seizures typically last 2-3 minutes (as an estimate.) His most recent seizure was on 02/10/2010; he has had a total of 10-12 seizures of this type. Many are unwitnessed while in bed.      He denies any aura symptoms like tastes, sounds, vision changes, smells. No association with alcohol. He reports the seizures will happen after a day when he has been able to sleep in a lot.      The patient does not recognize exacerbating or remitting factors with regard to seizure frequency.      Epilepsy-seizure predispositions:   The patient has no family history of epilepsy or seizures.    He has no history of gestational or  injury, febrile convulsions, developmental delay, stroke, meningitis, encephalitis, significant head injury, or other epileptic predispositions than the report cerebral malformation.       Laboratory  evaluations:   EEG on 5/6/2009 revealed occasional L frontotemporal slow activity with  occasional L temporal sharps.      Reports last MRI 2010, has small area of cortical malformation, we reviewed this on his phone       At [facility] on 11/18/2020, a brain 3T Tatum MRI was reported to show  cortical thinning of the posterior sylvian fissure and cingulate sulcus and adjacent to white matter volume loss and associated prominent subdural space , presumably on the left only.      Epilepsy therapeutics:   Keppra made him feel sleepy and mentally foggy.   Zonegran possibly had mild side effects, but mainly didn't work well for seizure control.  Lamotrigine has been well tolerated and effective.  He has been maintained on lamotrigine since 2010. He switched to generic 2019 due to insurance coverage, with no breakthrough seizures and no side effects.  He had a lamotrigine level of 7.5mcg (2.0-20.0mcg) on 01/10/2019.       PAST MEDICAL-SURGICAL HISTORY:    1. Lesional partial epilepsy.  2. Left perisylvian malformation.  3. Status post meniscus surgery.     FAMILY HISTORY:  There is no family history of seizures or epilepsy, or of other neurological conditions.       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  He lives with his wife in [place], [place].  He drinks alcohol rarely, denies tobacco use, denies recreational drugs.      REVIEW OF SYSTEMS:  The patient denied history of attention and memory disturbance, difficulties with comprehension and expression, difficulty in solving problems, weakness, tremors or other abnormal involuntary movements, numbness or tingling, incoordination, falling or difficulty in walking, urinary or fecal incontinence, headache and other pain, except as described above.  The patient denied any history of severe depression or suicidal ideation, severe anxiety, panic attacks, hallucinations, delusions, psychosis, substance abuse, or psychiatric  hospitalization.  He denied rashes and easy bruising.  The remainder of a 14-system symptom review was negative.     MEDICATIONS:  Lamotrigine 200 mg b.i.d., and other medications as per the electronic medical record.      PHYSICAL EXAMINATION:    The patient was alert and in no apparent distress.  Vital signs were as per the electronic medical record.  Skull was normocephalic and atraumatic.  Neck was supple, without signs of meningeal irritation.      On neurological examination the patient appeared alert and was fully oriented to person, place, time, and reason for visit.  Speech showed normal articulation, fluency, repetitions, naming, syntax and comprehension.  Cranial nerves III through XII were normal.  Muscle masses, tones and strengths were normal throughout.  There was no pronator drift.  Sequential fine finger movements were performed normally with each hand.  No spontaneous tremors, myoclonus, or other abnormal movements were observed.  Sensations of light touch, pin prick, vibration and proprioception were reportedly normal throughout.  The rapid alternating movements, and finger-nose-finger and heel-shin maneuvers were performed normally bilaterally.  Romberg maneuver was negative.  Regular, heel, toe, tandem and reverse tandem walking were normal.  Deep tendon reflexes were normal and symmetric throughout.  Toes were downgoing bilaterally.      IMPRESSION:    The patient remains seizure-free on lamotrigine.  He has no apparent adverse effects.       We discussed the 3T MRI that was performed on 11/18/2020.  The cortical deformity is likely at the site of ictal onsets, and it not appear to have features of a neoplasm, according to the radiologist.       We again reviewed reasons that he should establish care with a primary care provider, for blood pressure monitoring and recommendations on weight loss.  He agreed with this.     He has not had a seizure with impaired awareness for many years, and has  been driving under [place] regulations.  We specifically reviewed [place] regulations on seizures and driving with the patient.  He appeared to clearly understand that he is prohibited from operating a motor vehicle within 3 months following any seizure or other episode with sudden unconsciousness or inability to sit up, and that he is required to report any future such seizure to the DPS within 30 days after the event.       PLAN:    1) Continue the current dose of lamotrigine  2) Return visit in about 11 months.     I spent 32 minutes in this patient care, with 20 minutes in direct patient contact, and 12 minutes in chart review and document preparation.       Kevan Negrete M.D.   Professor of Neurology

## 2022-09-24 ENCOUNTER — HEALTH MAINTENANCE LETTER (OUTPATIENT)
Age: 32
End: 2022-09-24

## 2023-05-08 ENCOUNTER — HEALTH MAINTENANCE LETTER (OUTPATIENT)
Age: 33
End: 2023-05-08

## 2023-06-13 ENCOUNTER — OFFICE VISIT (OUTPATIENT)
Dept: NEUROLOGY | Facility: CLINIC | Age: 33
End: 2023-06-13
Payer: COMMERCIAL

## 2023-06-13 ENCOUNTER — LAB (OUTPATIENT)
Dept: LAB | Facility: CLINIC | Age: 33
End: 2023-06-13
Payer: COMMERCIAL

## 2023-06-13 VITALS — SYSTOLIC BLOOD PRESSURE: 142 MMHG | DIASTOLIC BLOOD PRESSURE: 86 MMHG | HEART RATE: 66 BPM

## 2023-06-13 DIAGNOSIS — G40.209 COMPLEX PARTIAL SEIZURE EVOLVING TO GENERALIZED SEIZURE (H): ICD-10-CM

## 2023-06-13 DIAGNOSIS — G40.209 COMPLEX PARTIAL SEIZURE EVOLVING TO GENERALIZED SEIZURE (H): Primary | ICD-10-CM

## 2023-06-13 LAB
ALBUMIN SERPL BCG-MCNC: 4.7 G/DL (ref 3.5–5.2)
ALP SERPL-CCNC: 72 U/L (ref 40–129)
ALT SERPL W P-5'-P-CCNC: 61 U/L (ref 0–70)
ANION GAP SERPL CALCULATED.3IONS-SCNC: 8 MMOL/L (ref 7–15)
AST SERPL W P-5'-P-CCNC: 34 U/L (ref 0–45)
BILIRUB SERPL-MCNC: 0.7 MG/DL
BUN SERPL-MCNC: 14.6 MG/DL (ref 6–20)
CALCIUM SERPL-MCNC: 9.8 MG/DL (ref 8.6–10)
CHLORIDE SERPL-SCNC: 102 MMOL/L (ref 98–107)
CREAT SERPL-MCNC: 1.25 MG/DL (ref 0.67–1.17)
DEPRECATED HCO3 PLAS-SCNC: 30 MMOL/L (ref 22–29)
GFR SERPL CREATININE-BSD FRML MDRD: 77 ML/MIN/1.73M2
GLUCOSE SERPL-MCNC: 95 MG/DL (ref 70–99)
POTASSIUM SERPL-SCNC: 4.2 MMOL/L (ref 3.4–5.3)
PROT SERPL-MCNC: 7.5 G/DL (ref 6.4–8.3)
SODIUM SERPL-SCNC: 140 MMOL/L (ref 136–145)

## 2023-06-13 PROCEDURE — 99000 SPECIMEN HANDLING OFFICE-LAB: CPT | Performed by: PATHOLOGY

## 2023-06-13 PROCEDURE — 80053 COMPREHEN METABOLIC PANEL: CPT | Performed by: PATHOLOGY

## 2023-06-13 PROCEDURE — 99214 OFFICE O/P EST MOD 30 MIN: CPT | Performed by: PSYCHIATRY & NEUROLOGY

## 2023-06-13 PROCEDURE — 80175 DRUG SCREEN QUAN LAMOTRIGINE: CPT | Mod: 90 | Performed by: PATHOLOGY

## 2023-06-13 PROCEDURE — 36415 COLL VENOUS BLD VENIPUNCTURE: CPT | Performed by: PATHOLOGY

## 2023-06-13 RX ORDER — LAMOTRIGINE 100 MG/1
200 TABLET ORAL 2 TIMES DAILY
Qty: 360 TABLET | Refills: 3 | Status: SHIPPED | OUTPATIENT
Start: 2023-06-13 | End: 2024-04-01

## 2023-06-13 NOTE — LETTER
6/13/2023       RE: Donnell Staples  [address] [facility] [place] [address]     Dear Colleague,    Thank you for referring your patient, Donnell Staples, to the [facility]. Please see a copy of my visit note below.      [facility]ilepsy [facility]:  RETURN VISIT          Service Date: 06/13/2023     I spent 38 minutes in this patient care, with 24 minutes in direct patient contact, and 14 minutes in chart review and document preparation on the day of the visit.     Kevan Negrete M.D.        Again, thank you for allowing me to participate in the care of your patient.      Sincerely,    Kevan Negrete MD

## 2023-06-13 NOTE — NURSING NOTE
Chief Complaint   Patient presents with     RECHECK     No seizures since last visit - doing well.      Lexis Crowder, CMA

## 2023-06-14 NOTE — PROGRESS NOTES
[facility]:  RETURN VISIT          Service Date: 2023     HISTORY:  Mr. Donnell Staples is a 33-year-old man with lesional partial epilepsy.  He came alone to the visit today.      He denied having any seizures or adverse medication effects, following the most recent visit to this clinic on 2022.       Ictal semiology-history:   He had a single grand mal seizure, which occurred at clinical epilepsy onset.  His seizures started in  when in college. He was under a lot of stress and had poor sleep. While he was falling asleep, he had a seizure. His brother found him unresponsive and jerking, with his bedding a mess and he was taken to the ER.     His seizures are characterized by right fifth finger shaking which he is able to feel before loss of consciousness. Then the shaking spreads through his whole body. He typically is lying in bed because they happen while he is trying to fall asleep. While he is just about to fall asleep. He had one while at his parents  house while standing and working at a computer. He has been tired and stressed prior. He became cyanotic. EMS was called. Seizures typically last 2-3 minutes (as an estimate.) His most recent seizure was on 02/10/2010; he has had a total of 10-12 seizures of this type. Many are unwitnessed while in bed.      He denies any aura symptoms like tastes, sounds, vision changes, smells. No association with alcohol. He reports the seizures will happen after a day when he has been able to sleep in a lot.      The patient does not recognize exacerbating or remitting factors with regard to seizure frequency.      Epilepsy-seizure predispositions:   The patient has no family history of epilepsy or seizures.    He has no history of gestational or  injury, febrile convulsions, developmental delay, stroke, meningitis, encephalitis, significant head injury, or other epileptic predispositions than the report cerebral  malformation.       Laboratory evaluations:   EEG on 5/6/2009 revealed occasional L frontotemporal slow activity with  occasional L temporal sharps.      Reports last MRI 2010, has small area of cortical malformation, we reviewed this on his phone       At [facility] on 11/18/2020, a brain 3T Tatum MRI was reported to show  cortical thinning of the posterior sylvian fissure and cingulate sulcus and adjacent to white matter volume loss and associated prominent subdural space , presumably on the left only.      Epilepsy therapeutics:   Keppra made him feel sleepy and mentally foggy.   Zonegran possibly had mild side effects, but mainly didn't work well for seizure control.  Lamotrigine has been well tolerated and effective.  He has been maintained on lamotrigine since 2010. He switched to generic 2019 due to insurance coverage, with no breakthrough seizures and no side effects.  He had a lamotrigine level of 7.5mcg (2.0-20.0mcg) on 01/10/2019.       PAST MEDICAL-SURGICAL HISTORY:    1. Lesional partial epilepsy.  2. Left perisylvian malformation.  3. Status post meniscus surgery.     FAMILY HISTORY:  There is no family history of seizures or epilepsy, or of other neurological conditions.       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  He lives with his wife in [place], [place].  He drinks alcohol rarely, denies tobacco use, denies recreational drugs.      REVIEW OF SYSTEMS:  The patient denied history of attention and memory disturbance, difficulties with comprehension and expression, difficulty in solving problems, weakness, tremors or other abnormal involuntary movements, numbness or tingling, incoordination, falling or difficulty in walking, urinary or fecal incontinence, headache and other pain, except as described above.  The patient denied any history of severe depression or suicidal ideation, severe anxiety, panic attacks, hallucinations, delusions, psychosis,  substance abuse, or psychiatric hospitalization.  He denied rashes and easy bruising.  The remainder of a 14-system symptom review was negative.     MEDICATIONS:  Lamotrigine 200 mg b.i.d., and other medications as per the electronic medical record.      PHYSICAL EXAMINATION:    The patient was alert and in no apparent distress.  Vital signs were as per the electronic medical record.  Skull was normocephalic and atraumatic.  Neck was supple, without signs of meningeal irritation.       On neurological examination the patient appeared alert and was fully oriented to person, place, time, and reason for visit.  Speech showed normal articulation, fluency, repetitions, naming, syntax and comprehension.  Cranial nerves III through XII were normal.  Muscle masses, tones and strengths were normal throughout.  There was no pronator drift.  Sequential fine finger movements were performed normally with each hand.  No spontaneous tremors, myoclonus, or other abnormal movements were observed.  Sensations of light touch, pin prick, vibration and proprioception were reportedly normal throughout.  The rapid alternating movements, and finger-nose-finger and heel-shin maneuvers were performed normally bilaterally.  Romberg maneuver was negative.  Regular, heel, toe, tandem and reverse tandem walking were normal.  Deep tendon reflexes were normal and symmetric throughout.  Toes were downgoing bilaterally.       IMPRESSION:    The patient remains seizure-free on lamotrigine.  He has no apparent adverse effects.       We discussed the 3T MRI that was performed on 11/18/2020.  The cortical deformity is likely at the site of ictal onsets, and it not appear to have features of a neoplasm, according to the radiologist.       We again reviewed reasons that he should establish care with a primary care provider, for blood pressure monitoring and recommendations on weight loss.  He agreed with this.     He has not had a seizure with impaired  awareness for many years, and has been driving under [place] regulations.  We specifically reviewed [place] regulations on seizures and driving with the patient.  He appeared to clearly understand that he is prohibited from operating a motor vehicle within 3 months following any seizure or other episode with sudden unconsciousness or inability to sit up, and that he is required to report any future such seizure to the DPS within 30 days after the event.       PLAN:    1)  Continue the current dose of lamotrigine.   2)  Check lamotrigine level.   3)  Return visit in about 10 months.      I spent 38 minutes in this patient care, with 24 minutes in direct patient contact, and 14 minutes in chart review and document preparation on the day of the visit.        Kevan Negrete M.D.   Professor of Neurology

## 2023-06-16 LAB — LAMOTRIGINE SERPL-MCNC: 6.2 UG/ML

## 2023-11-07 ENCOUNTER — OFFICE VISIT (OUTPATIENT)
Dept: FAMILY MEDICINE | Facility: CLINIC | Age: 33
End: 2023-11-07
Payer: COMMERCIAL

## 2023-11-07 VITALS
OXYGEN SATURATION: 96 % | HEART RATE: 83 BPM | SYSTOLIC BLOOD PRESSURE: 137 MMHG | BODY MASS INDEX: 37.51 KG/M2 | DIASTOLIC BLOOD PRESSURE: 81 MMHG | TEMPERATURE: 98.4 F | HEIGHT: 73 IN | WEIGHT: 283 LBS | RESPIRATION RATE: 16 BRPM

## 2023-11-07 DIAGNOSIS — Z00.00 ROUTINE GENERAL MEDICAL EXAMINATION AT A HEALTH CARE FACILITY: Primary | ICD-10-CM

## 2023-11-07 PROCEDURE — 90686 IIV4 VACC NO PRSV 0.5 ML IM: CPT | Performed by: FAMILY MEDICINE

## 2023-11-07 PROCEDURE — 90471 IMMUNIZATION ADMIN: CPT | Performed by: FAMILY MEDICINE

## 2023-11-07 PROCEDURE — 90472 IMMUNIZATION ADMIN EACH ADD: CPT | Performed by: FAMILY MEDICINE

## 2023-11-07 PROCEDURE — 99385 PREV VISIT NEW AGE 18-39: CPT | Mod: 25 | Performed by: FAMILY MEDICINE

## 2023-11-07 PROCEDURE — 90715 TDAP VACCINE 7 YRS/> IM: CPT | Performed by: FAMILY MEDICINE

## 2023-11-07 ASSESSMENT — ENCOUNTER SYMPTOMS
FEVER: 0
DIZZINESS: 0
JOINT SWELLING: 0
CHILLS: 0
SORE THROAT: 0
FREQUENCY: 0
PARESTHESIAS: 0
COUGH: 0
HEMATURIA: 0
ARTHRALGIAS: 0
HEMATOCHEZIA: 0
HEARTBURN: 1
NAUSEA: 0
HEADACHES: 0
SHORTNESS OF BREATH: 0
ABDOMINAL PAIN: 0
CONSTIPATION: 0
PALPITATIONS: 0
WEAKNESS: 0
NERVOUS/ANXIOUS: 0
EYE PAIN: 0
MYALGIAS: 0
DIARRHEA: 0
DYSURIA: 0

## 2023-11-07 ASSESSMENT — PAIN SCALES - GENERAL: PAINLEVEL: NO PAIN (0)

## 2023-11-07 NOTE — PROGRESS NOTES
SUBJECTIVE:   CC: Donnell is an 33 year old who presents for preventative health visit.       11/7/2023     4:57 PM   Additional Questions   Roomed by Allie   Accompanied by Self       Healthy Habits:     Getting at least 3 servings of Calcium per day:  Yes    Bi-annual eye exam:  Yes    Dental care twice a year:  Yes    Sleep apnea or symptoms of sleep apnea:  None    Diet:  Regular (no restrictions)    Frequency of exercise:  1 day/week    Duration of exercise:  Less than 15 minutes    Taking medications regularly:  Yes    Additional concerns today:  No    He is scheduled  to have upper EGD / EUS   Had CT scan showed pancreas has a tail possible atrophy - no records available   [facility] will [facility] to clarify if he needs preop   Hx of Seizure follows with neurology - stable on Lamictal   Trying with his wife to have kids over the past 18 months   Follows with Dermatology for Alopecia , taking Finasteride, Minoxidil     Preventive -    Immunization History   Administered Date(s) Administered    Influenza (IIV3) PF 11/20/2021    Influenza Vaccine >6 months (Alfuria,Fluzone) 11/07/2023    Influenza,INJ,MDCK,PF,Quad >6mo(Flucelvax) 11/19/2019    TDAP (Adacel,Boostrix) 11/07/2023       - Colon CA screen: Colonoscopy, age 45-75 every 10 years or FIT every year or Cologuard every 3 years     Had diagnostic colonoscopy 08/2023 showed some polyps     Have you ever done Advance Care Planning? (For example, a Health Directive, POLST, or a discussion with a medical provider or your loved ones about your wishes): No, advance care planning information given to patient to review.  Patient plans to discuss their wishes with loved ones or provider.      Social History     Tobacco Use    Smoking status: Never    Smokeless tobacco: Never   Substance Use Topics    Alcohol use: Not Currently             11/7/2023    12:19 PM   Alcohol Use   Prescreen: >3 drinks/day or >7 drinks/week? No   SH:    Marital status:    Kids: no  "  Employment: CPA   Exercise: yes   Tobacco: no   Etoh: rare   Recreational drugs: no      Last PSA: No results found for: \"PSA\"    Reviewed orders with patient. Reviewed health maintenance and updated orders accordingly - Yes  Lab work is in process  BP Readings from Last 3 Encounters:   11/07/23 137/81   06/13/23 (!) 142/86   07/25/22 126/81    Wt Readings from Last 3 Encounters:   11/07/23 128.4 kg (283 lb)   07/25/22 121.7 kg (268 lb 3.2 oz)   06/22/21 122.5 kg (270 lb)                  Patient Active Problem List   Diagnosis   (none) - all problems resolved or deleted     Past Surgical History:   Procedure Laterality Date    COLONOSCOPY  August 2023    Preventative    ORTHOPEDIC SURGERY      SOFT TISSUE SURGERY  2017    Torn Meniscus       Social History     Tobacco Use    Smoking status: Never    Smokeless tobacco: Never   Substance Use Topics    Alcohol use: Not Currently     Family History   Problem Relation Age of Onset    Hypertension Mother     Hypertension Father     Genetic Disorder Father         Muscular Dystrophy         Current Outpatient Medications   Medication Sig Dispense Refill    famotidine (PEPCID) 10 MG tablet       finasteride (PROPECIA) 1 MG tablet       lamoTRIgine (LAMICTAL) 100 MG tablet Take 2 tablets (200 mg) by mouth 2 times daily Patient prefers Taro lamotrigine. 360 tablet 3    minoxidil (LONITEN) 1.25 mg half-tab Take 1.25 mg by mouth daily       No Known Allergies  Recent Labs   Lab Test 06/13/23  1738   ALT 61   CR 1.25*   GFRESTIMATED 77   POTASSIUM 4.2        Reviewed and updated as needed this visit by clinical staff   Tobacco  Allergies  Meds   Med Hx  Surg Hx  Fam Hx          Reviewed and updated as needed this visit by Provider       Med Hx  Surg Hx  Fam Hx         Past Medical History:   Diagnosis Date    Seizures (H)       Past Surgical History:   Procedure Laterality Date    COLONOSCOPY  August 2023    Preventative    ORTHOPEDIC SURGERY      SOFT TISSUE SURGERY " " 2017    Torn Meniscus       Review of Systems   Constitutional:  Negative for chills and fever.   HENT:  Negative for congestion, ear pain, hearing loss and sore throat.    Eyes:  Negative for pain and visual disturbance.   Respiratory:  Negative for cough and shortness of breath.    Cardiovascular:  Negative for chest pain, palpitations and peripheral edema.   Gastrointestinal:  Positive for heartburn. Negative for abdominal pain, constipation, diarrhea, hematochezia and nausea.   Genitourinary:  Negative for dysuria, frequency, genital sores, hematuria, impotence, penile discharge and urgency.   Musculoskeletal:  Negative for arthralgias, joint swelling and myalgias.   Skin:  Negative for rash.   Neurological:  Negative for dizziness, weakness, headaches and paresthesias.   Psychiatric/Behavioral:  Negative for mood changes. The patient is not nervous/anxious.          OBJECTIVE:   /81   Pulse 83   Temp 98.4  F (36.9  C) (Tympanic)   Resp 16   Ht 1.861 m (6' 1.25\")   Wt 128.4 kg (283 lb)   SpO2 96%   BMI 37.08 kg/m      Physical Exam  GENERAL: healthy, alert and no distress  EYES: Eyes grossly normal to inspection, PERRL and conjunctivae and sclerae normal  HENT: ear canals and TM's normal, nose and mouth without ulcers or lesions  NECK: no adenopathy, no asymmetry, masses, or scars and thyroid normal to palpation  RESP: lungs clear to auscultation - no rales, rhonchi or wheezes  CV: regular rate and rhythm, normal S1 S2, no S3 or S4, no murmur, click or rub, no peripheral edema and peripheral pulses strong  ABDOMEN: soft, nontender, no hepatosplenomegaly, no masses and bowel sounds normal  MS: no gross musculoskeletal defects noted, no edema  SKIN: no suspicious lesions or rashes  NEURO: Normal strength and tone, mentation intact and speech normal  PSYCH: mentation appears normal, affect normal/bright        ASSESSMENT/PLAN:       ICD-10-CM    1. Routine general medical examination at a [facility] " facility  Z00.00 Lipid panel reflex to direct LDL Fasting     CBC with Platelets & Differential     Comprehensive metabolic panel     Hemoglobin A1c          Patient has been advised of split billing requirements and indicates understanding: Yes      COUNSELING:   Reviewed preventive health counseling, as reflected in patient instructions       Regular exercise       Healthy diet/nutrition       Immunizations  Vaccinated for: Influenza and TDAP          He reports that he has never smoked. He has never used smokeless tobacco.            Natty Petersen MD  [facility]

## 2023-11-14 ENCOUNTER — LAB (OUTPATIENT)
Dept: LAB | Facility: CLINIC | Age: 33
End: 2023-11-14
Payer: COMMERCIAL

## 2023-11-14 DIAGNOSIS — Z00.00 ROUTINE GENERAL MEDICAL EXAMINATION AT A HEALTH CARE FACILITY: ICD-10-CM

## 2023-11-14 LAB
ALBUMIN SERPL BCG-MCNC: 4.5 G/DL (ref 3.5–5.2)
ALP SERPL-CCNC: 65 U/L (ref 40–150)
ALT SERPL W P-5'-P-CCNC: 37 U/L (ref 0–70)
ANION GAP SERPL CALCULATED.3IONS-SCNC: 11 MMOL/L (ref 7–15)
AST SERPL W P-5'-P-CCNC: 31 U/L (ref 0–45)
BASOPHILS # BLD AUTO: 0.1 10E3/UL (ref 0–0.2)
BASOPHILS NFR BLD AUTO: 1 %
BILIRUB SERPL-MCNC: 0.8 MG/DL
BUN SERPL-MCNC: 13.3 MG/DL (ref 6–20)
CALCIUM SERPL-MCNC: 9.7 MG/DL (ref 8.6–10)
CHLORIDE SERPL-SCNC: 101 MMOL/L (ref 98–107)
CHOLEST SERPL-MCNC: 174 MG/DL
CREAT SERPL-MCNC: 1.27 MG/DL (ref 0.67–1.17)
DEPRECATED HCO3 PLAS-SCNC: 27 MMOL/L (ref 22–29)
EGFRCR SERPLBLD CKD-EPI 2021: 76 ML/MIN/1.73M2
EOSINOPHIL # BLD AUTO: 0.4 10E3/UL (ref 0–0.7)
EOSINOPHIL NFR BLD AUTO: 5 %
ERYTHROCYTE [DISTWIDTH] IN BLOOD BY AUTOMATED COUNT: 12.6 % (ref 10–15)
GLUCOSE SERPL-MCNC: 97 MG/DL (ref 70–99)
HBA1C MFR BLD: 5.3 % (ref 0–5.6)
HCT VFR BLD AUTO: 46.8 % (ref 40–53)
HDLC SERPL-MCNC: 38 MG/DL
HGB BLD-MCNC: 16 G/DL (ref 13.3–17.7)
IMM GRANULOCYTES # BLD: 0 10E3/UL
IMM GRANULOCYTES NFR BLD: 0 %
LDLC SERPL CALC-MCNC: 104 MG/DL
LYMPHOCYTES # BLD AUTO: 3.2 10E3/UL (ref 0.8–5.3)
LYMPHOCYTES NFR BLD AUTO: 36 %
MCH RBC QN AUTO: 29.3 PG (ref 26.5–33)
MCHC RBC AUTO-ENTMCNC: 34.2 G/DL (ref 31.5–36.5)
MCV RBC AUTO: 86 FL (ref 78–100)
MONOCYTES # BLD AUTO: 0.7 10E3/UL (ref 0–1.3)
MONOCYTES NFR BLD AUTO: 8 %
NEUTROPHILS # BLD AUTO: 4.5 10E3/UL (ref 1.6–8.3)
NEUTROPHILS NFR BLD AUTO: 50 %
NONHDLC SERPL-MCNC: 136 MG/DL
PLATELET # BLD AUTO: 229 10E3/UL (ref 150–450)
POTASSIUM SERPL-SCNC: 4.5 MMOL/L (ref 3.4–5.3)
PROT SERPL-MCNC: 7.3 G/DL (ref 6.4–8.3)
RBC # BLD AUTO: 5.46 10E6/UL (ref 4.4–5.9)
SODIUM SERPL-SCNC: 139 MMOL/L (ref 135–145)
TRIGL SERPL-MCNC: 162 MG/DL
WBC # BLD AUTO: 9 10E3/UL (ref 4–11)

## 2023-11-14 PROCEDURE — 83036 HEMOGLOBIN GLYCOSYLATED A1C: CPT

## 2023-11-14 PROCEDURE — 85025 COMPLETE CBC W/AUTO DIFF WBC: CPT

## 2023-11-14 PROCEDURE — 80061 LIPID PANEL: CPT

## 2023-11-14 PROCEDURE — 36415 COLL VENOUS BLD VENIPUNCTURE: CPT

## 2023-11-14 PROCEDURE — 80053 COMPREHEN METABOLIC PANEL: CPT

## 2023-11-16 ENCOUNTER — OFFICE VISIT (OUTPATIENT)
Dept: FAMILY MEDICINE | Facility: CLINIC | Age: 33
End: 2023-11-16
Payer: COMMERCIAL

## 2023-11-16 VITALS
TEMPERATURE: 97.5 F | HEART RATE: 74 BPM | DIASTOLIC BLOOD PRESSURE: 76 MMHG | WEIGHT: 278 LBS | SYSTOLIC BLOOD PRESSURE: 121 MMHG | BODY MASS INDEX: 36.84 KG/M2 | HEIGHT: 73 IN | OXYGEN SATURATION: 95 % | RESPIRATION RATE: 22 BRPM

## 2023-11-16 DIAGNOSIS — R56.9 SEIZURES (H): ICD-10-CM

## 2023-11-16 DIAGNOSIS — L65.9 ALOPECIA: ICD-10-CM

## 2023-11-16 DIAGNOSIS — Q45.3 PANCREATIC ABNORMALITY: ICD-10-CM

## 2023-11-16 DIAGNOSIS — Z01.818 PREOP GENERAL PHYSICAL EXAM: Primary | ICD-10-CM

## 2023-11-16 PROCEDURE — 99214 OFFICE O/P EST MOD 30 MIN: CPT | Performed by: FAMILY MEDICINE

## 2023-11-16 ASSESSMENT — PAIN SCALES - GENERAL: PAINLEVEL: NO PAIN (0)

## 2023-11-16 NOTE — PROGRESS NOTES
[address] [address]  Phone: [phone]  Primary Provider: Natty Petersen  Pre-op Performing Provider: BESSIE SEGURA    PREOPERATIVE EVALUATION:  Today's date: 11/16/2023    Donnell is a 33 year old, presenting for the following:  Pre-Op Exam        11/16/2023    10:07 AM   Additional Questions   Roomed by Kvng MCCARTY CMA   Accompanied by Self         11/16/2023    10:07 AM   Patient Reported Additional Medications   Patient reports taking the following new medications None       Surgical Information:  Surgery/Procedure: Endoscopic Ultrasound  Surgery Location: [facility]  Surgeon: Dr. Faith  Surgery Date: 12/08/23  Time of Surgery: 1:45 PM  Where patient plans to recover: At home with family  Fax number for surgical facility: [phone]    Assessment & Plan     The proposed surgical procedure is considered INTERMEDIATE risk.    Preop general physical exam  Nothing to eat or drink after midnight the morning of surgery.  No NSAID one week before surgery.    Pancreatic abnormality  EGD/EUS.  Cleared for procedure.    Seizures (H)  Stable, continue with current medications  No seizure activities, since, 2010.    Alopecia  Continue with med.     - No identified additional risk factors other than previously addressed    Antiplatelet or Anticoagulation Medication Instructions:   - Patient is on no antiplatelet or anticoagulation medications.    Additional Medication Instructions:  Patient is to take all scheduled medications on the day of surgery EXCEPT for modifications listed below:    RECOMMENDATION:  APPROVAL GIVEN to proceed with proposed procedure, without further diagnostic evaluation.      Subjective       HPI related to upcoming procedure:   Patient reports he had a CT scan through [facility], showing he had a small pancreatic tail.  He is concerned to have abnormal ERCP.  Patient did have a colonoscopy recently in August  of this year, he has no complication, no bleeding.    History of seizure, last seen January 2010.        11/16/2023     9:52 AM   Preop Questions   1. Have you ever had   he is scheduled to have a 1 a heart attack or stroke? No   2. Have you ever had surgery on your heart or blood vessels, such as a stent placement, a coronary artery bypass, or surgery on an artery in your head, neck, heart, or legs? No   3. Do you have chest pain with activity? No   4. Do you have a history of  heart failure? No   5. Do you currently have a cold, bronchitis or symptoms of other infection? YES - improving, no other symptoms.   6. Do you have a cough, shortness of breath, or wheezing? No   7. Do you or anyone in your family have previous history of blood clots? No   8. Do you or does anyone in your family have a serious bleeding problem such as prolonged bleeding following surgeries or cuts? No   9. Have you ever had problems with anemia or been told to take iron pills? No   10. Have you had any abnormal blood loss such as black, tarry or bloody stools? No   11. Have you ever had a blood transfusion? No   12. Are you willing to have a blood transfusion if it is medically needed before, during, or after your surgery? Yes   13. Have you or any of your relatives ever had problems with anesthesia? No   14. Do you have sleep apnea, excessive snoring or daytime drowsiness? No   15. Do you have any artifical heart valves or other implanted medical devices like a pacemaker, defibrillator, or continuous glucose monitor? No   16. Do you have artificial joints? No   17. Are you allergic to latex? No       Health Care Directive:  Patient does not have a Health Care Directive or Living Will:     Preoperative Review of :   reviewed - no record of controlled substances prescribed.    Status of Chronic Conditions:  See problem list for active medical problems.  Problems all longstanding and stable, except as noted/documented.  See ROS for  pertinent symptoms related to these conditions.    Review of Systems  CONSTITUTIONAL: NEGATIVE for fever, chills, change in weight  INTEGUMENTARY/SKIN: NEGATIVE for worrisome rashes, moles or lesions  EYES: NEGATIVE for vision changes or irritation  ENT/MOUTH: NEGATIVE for ear, mouth and throat problems  RESP: NEGATIVE for significant cough or SOB  CV: NEGATIVE for chest pain, palpitations or peripheral edema  GI: NEGATIVE for nausea, abdominal pain, heartburn, or change in bowel habits  : NEGATIVE for frequency, dysuria, or hematuria  MUSCULOSKELETAL: NEGATIVE for significant arthralgias or myalgia  NEURO: NEGATIVE for weakness, dizziness or paresthesias  ENDOCRINE: NEGATIVE for temperature intolerance, skin/hair changes  HEME: NEGATIVE for bleeding problems  PSYCHIATRIC: NEGATIVE for changes in mood or affect    There are no problems to display for this patient.     Past Medical History:   Diagnosis Date    Seizures (H)      Past Surgical History:   Procedure Laterality Date    COLONOSCOPY  August 2023    Preventative    ORTHOPEDIC SURGERY      SOFT TISSUE SURGERY  2017    Torn Meniscus     Current Outpatient Medications   Medication Sig Dispense Refill    famotidine (PEPCID) 10 MG tablet       finasteride (PROPECIA) 1 MG tablet       lamoTRIgine (LAMICTAL) 100 MG tablet Take 2 tablets (200 mg) by mouth 2 times daily Patient prefers Taro lamotrigine. 360 tablet 3    minoxidil (LONITEN) 1.25 mg half-tab Take 1.25 mg by mouth daily         No Known Allergies     Social History     Tobacco Use    Smoking status: Never     Passive exposure: Never    Smokeless tobacco: Never   Substance Use Topics    Alcohol use: Not Currently     Family History   Problem Relation Age of Onset    Hypertension Mother     Hypertension Father     Genetic Disorder Father         Muscular Dystrophy     History   Drug Use Unknown         Objective     /76 (BP Location: Right arm, Patient Position: Chair, Cuff Size: Adult Large)    "Pulse 74   Temp 97.5  F (36.4  C) (Oral)   Resp 22   Ht 1.865 m (6' 1.43\")   Wt 126.1 kg (278 lb)   SpO2 95%   BMI 36.25 kg/m      Physical Exam    GENERAL APPEARANCE: healthy, alert and no distress     EYES: EOMI,  PERRL     HENT: ear canals and TM's normal and nose and mouth without ulcers or lesions     NECK: no adenopathy, no asymmetry, masses, or scars and thyroid normal to palpation     RESP: lungs clear to auscultation - no rales, rhonchi or wheezes     CV: regular rates and rhythm, normal S1 S2, no S3 or S4 and no murmur, click or rub     ABDOMEN:  soft, nontender, no HSM or masses and bowel sounds normal     MS: extremities normal- no gross deformities noted, no evidence of inflammation in joints, FROM in all extremities.     SKIN: no suspicious lesions or rashes     NEURO: Normal strength and tone, sensory exam grossly normal, mentation intact and speech normal     PSYCH: mentation appears normal. and affect normal/bright     LYMPHATICS: No cervical adenopathy    Recent Labs   Lab Test 11/14/23  0806 06/13/23  1738   HGB 16.0  --      --     140   POTASSIUM 4.5 4.2   CR 1.27* 1.25*   A1C 5.3  --         Diagnostics:  No labs were ordered during this visit.   No EKG required for low risk surgery (cataract, skin procedure, breast biopsy, etc).    Revised Cardiac Risk Index (RCRI):  The patient has the following serious cardiovascular risks for perioperative complications:   - No serious cardiac risks = 0 points     RCRI Interpretation: 0 points: Class I (very low risk - 0.4% complication rate)        Signed Electronically by: Pavan Tapia MD  Copy of this evaluation report is provided to requesting physician.      "

## 2023-11-16 NOTE — PATIENT INSTRUCTIONS
Preparing for Your Surgery  Getting started  A nurse will call you to review your health history and instructions. They will give you an arrival time based on your scheduled surgery time. Please be ready to share:  Your doctor's clinic name and phone number  Your medical, surgical, and anesthesia history  A list of allergies and sensitivities  A list of medicines, including herbal treatments and over-the-counter drugs  Whether the patient has a legal guardian (ask how to send us the papers in advance)  Please tell us if you're pregnant--or if there's any chance you might be pregnant. Some surgeries may injure a fetus (unborn baby), so they require a pregnancy test. Surgeries that are safe for a fetus don't always need a test, and you can choose whether to have one.   If you have a child who's having surgery, please ask for a copy of Preparing for Your Child's Surgery.    Preparing for surgery  Within 10 to 30 days of surgery: Have a pre-op exam (sometimes called an H&P, or History and Physical). This can be done at a clinic or pre-operative center.  If you're having a , you may not need this exam. Talk to your care team.  At your pre-op exam, talk to your care team about all medicines you take. If you need to stop any medicines before surgery, ask when to start taking them again.  We do this for your safety. Many medicines can make you bleed too much during surgery. Some change how well surgery (anesthesia) drugs work.  Call your insurance company to let them know you're having surgery. (If you don't have insurance, call [phone].)  Call your clinic if there's any change in your health. This includes signs of a cold or flu (sore throat, runny nose, cough, rash, fever). It also includes a scrape or scratch near the surgery site.  If you have questions on the day of surgery, call your hospital or surgery center.  Eating and drinking guidelines  For your safety: Unless your surgeon tells you otherwise,  follow the guidelines below.  Eat and drink as usual until 8 hours before you arrive for surgery. After that, no food or milk.  Drink clear liquids until 2 hours before you arrive. These are liquids you can see through, like water, Gatorade, and Propel Water. They also include plain black coffee and tea (no cream or milk), candy, and breath mints. You can spit out gum when you arrive.  If you drink alcohol: Stop drinking it the night before surgery.  If your care team tells you to take medicine on the morning of surgery, it's okay to take it with a sip of water.  Preventing infection  Shower or bathe the night before and morning of your surgery. Follow the instructions your clinic gave you. (If no instructions, use regular soap.)  Don't shave or clip hair near your surgery site. We'll remove the hair if needed.  Don't smoke or vape the morning of surgery. You may chew nicotine gum up to 2 hours before surgery. A nicotine patch is okay.  Note: Some surgeries require you to completely quit smoking and nicotine. Check with your surgeon.  Your care team will make every effort to keep you safe from infection. We will:  Clean our hands often with soap and water (or an alcohol-based hand rub).  Clean the skin at your surgery site with a special soap that kills germs.  Give you a special gown to keep you warm. (Cold raises the risk of infection.)  Wear special hair covers, masks, gowns and gloves during surgery.  Give antibiotic medicine, if prescribed. Not all surgeries need antibiotics.  What to bring on the day of surgery  Photo ID and insurance card  Copy of your health care directive, if you have one  Glasses and hearing aids (bring cases)  You can't wear contacts during surgery  Inhaler and eye drops, if you use them (tell us about these when you arrive)  CPAP machine or breathing device, if you use them  A few personal items, if spending the night  If you have . . .  A pacemaker, ICD (cardiac defibrillator) or other  implant: Bring the ID card.  An implanted stimulator: Bring the remote control.  A legal guardian: Bring a copy of the certified (court-stamped) guardianship papers.  Please remove any jewelry, including body piercings. Leave jewelry and other valuables at home.  If you're going home the day of surgery  You must have a responsible adult drive you home. They should stay with you overnight as well.  If you don't have someone to stay with you, and you aren't safe to go home alone, we may keep you overnight. Insurance often won't pay for this.  After surgery  If it's hard to control your pain or you need more pain medicine, please call your surgeon's office.  Questions?   If you have any questions for your care team, list them here: _________________________________________________________________________________________________________________________________________________________________________ ____________________________________ ____________________________________ ____________________________________  For informational purposes only. Not to replace the advice of your health care provider. Copyright   2003, 2019 [facility]. All rights reserved. Clinically reviewed by Camila Rodriguez MD. [employer] 735585 - REV 12/22.    How to Take Your Medication Before Surgery  - Take all of your medications before surgery except as noted below

## 2024-02-08 ENCOUNTER — OFFICE VISIT (OUTPATIENT)
Dept: FAMILY MEDICINE | Facility: CLINIC | Age: 34
End: 2024-02-08
Payer: COMMERCIAL

## 2024-02-08 VITALS
DIASTOLIC BLOOD PRESSURE: 83 MMHG | BODY MASS INDEX: 37.77 KG/M2 | SYSTOLIC BLOOD PRESSURE: 138 MMHG | HEART RATE: 89 BPM | RESPIRATION RATE: 16 BRPM | TEMPERATURE: 98.1 F | OXYGEN SATURATION: 99 % | HEIGHT: 73 IN | WEIGHT: 285 LBS

## 2024-02-08 DIAGNOSIS — N46.9 INFERTILITY MALE: ICD-10-CM

## 2024-02-08 DIAGNOSIS — Z13.29 SCREENING FOR THYROID DISORDER: Primary | ICD-10-CM

## 2024-02-08 PROCEDURE — 84443 ASSAY THYROID STIM HORMONE: CPT | Performed by: FAMILY MEDICINE

## 2024-02-08 PROCEDURE — 36415 COLL VENOUS BLD VENIPUNCTURE: CPT | Performed by: FAMILY MEDICINE

## 2024-02-08 PROCEDURE — 99213 OFFICE O/P EST LOW 20 MIN: CPT | Performed by: FAMILY MEDICINE

## 2024-02-08 ASSESSMENT — PAIN SCALES - GENERAL: PAINLEVEL: NO PAIN (0)

## 2024-02-08 NOTE — PROGRESS NOTES
"  Assessment & Plan   Donnell Staples is a 34 year old male who presents today to obtain thyroid function test as part of evaluation of fatigue.  He is scheduled for semen analysis at outside clinic  Screening for thyroid disorder  No current concern for thyroid symptoms.  - TSH with free T4 reflex; Future  - TSH with free T4 reflex    Infertility male  Patient requested to check thyroid function test for evaluation of infertility            BMI  Estimated body mass index is 37.19 kg/m  as calculated from the following:    Height as of this encounter: 1.864 m (6' 1.4\").    Weight as of this encounter: 129.3 kg (285 lb).             Subjective   Donnell is a 34 year old, presenting for the following health issues:  LAB REQUEST (Thyroid/)        2/8/2024     3:55 PM   Additional Questions   Roomed by Allie MARIE   Accompanied by Self     History of Present Illness       Reason for visit:  Need to get thyroid test    He eats 0-1 servings of fruits and vegetables daily.He consumes 0 sweetened beverage(s) daily.He exercises with enough effort to increase his heart rate 10 to 19 minutes per day.  He exercises with enough effort to increase his heart rate 3 or less days per week.   He is taking medications regularly.                 Review of Systems  Constitutional, HEENT, cardiovascular, pulmonary, gi and gu systems are negative, except as otherwise noted.      Objective    /83   Pulse 89   Temp 98.1  F (36.7  C) (Tympanic)   Wt 129.3 kg (285 lb)   BMI 37.17 kg/m    Body mass index is 37.17 kg/m .  Physical Exam  Vitals and nursing note reviewed.   Constitutional:       General: He is not in acute distress.     Appearance: Normal appearance. He is not ill-appearing, toxic-appearing or diaphoretic.   HENT:      Head: Normocephalic and atraumatic.   Neurological:      Mental Status: He is alert.                    Signed Electronically by: Natty Petersen MD    "

## 2024-02-09 LAB — TSH SERPL DL<=0.005 MIU/L-ACNC: 2.91 UIU/ML (ref 0.3–4.2)

## 2024-02-15 ENCOUNTER — TELEPHONE (OUTPATIENT)
Dept: NEUROLOGY | Facility: CLINIC | Age: 34
End: 2024-02-15

## 2024-02-16 ENCOUNTER — TELEPHONE (OUTPATIENT)
Dept: NEUROLOGY | Facility: CLINIC | Age: 34
End: 2024-02-16

## 2024-04-01 ENCOUNTER — TELEPHONE (OUTPATIENT)
Dept: NEUROLOGY | Facility: CLINIC | Age: 34
End: 2024-04-01
Payer: COMMERCIAL

## 2024-04-01 ENCOUNTER — MYC MEDICAL ADVICE (OUTPATIENT)
Dept: NEUROLOGY | Facility: CLINIC | Age: 34
End: 2024-04-01
Payer: COMMERCIAL

## 2024-04-01 DIAGNOSIS — G40.209 COMPLEX PARTIAL SEIZURE EVOLVING TO GENERALIZED SEIZURE (H): ICD-10-CM

## 2024-04-01 RX ORDER — LAMOTRIGINE 100 MG/1
200 TABLET ORAL 2 TIMES DAILY
Qty: 120 TABLET | Refills: 0 | Status: SHIPPED | OUTPATIENT
Start: 2024-04-01 | End: 2024-04-23

## 2024-04-01 NOTE — TELEPHONE ENCOUNTER
[facility] Call Center    Phone Message    May a detailed message be left on voicemail: yes     Reason for Call: Medication Refill Request    Has the patient contacted the pharmacy for the refill? Yes   Name of medication being requested: lamoTRIgine (LAMICTAL) 100 MG tablet  Provider who prescribed the medication: Dr Negrete  Pharmacy: [address]  Date medication is needed: 04/02/2024     Patient states he will be out of medication tomorrow (04/02/2024) and will need enough to get through to the upcoming appt on 04/2/2024    Action Taken: Message routed to:  [facility] Neurology    Travel Screening: Not Applicable

## 2024-04-23 ENCOUNTER — OFFICE VISIT (OUTPATIENT)
Dept: NEUROLOGY | Facility: CLINIC | Age: 34
End: 2024-04-23
Payer: COMMERCIAL

## 2024-04-23 VITALS
RESPIRATION RATE: 16 BRPM | SYSTOLIC BLOOD PRESSURE: 137 MMHG | HEART RATE: 71 BPM | OXYGEN SATURATION: 96 % | DIASTOLIC BLOOD PRESSURE: 85 MMHG

## 2024-04-23 DIAGNOSIS — G40.209 COMPLEX PARTIAL SEIZURE EVOLVING TO GENERALIZED SEIZURE (H): ICD-10-CM

## 2024-04-23 PROCEDURE — 99214 OFFICE O/P EST MOD 30 MIN: CPT | Performed by: PSYCHIATRY & NEUROLOGY

## 2024-04-23 RX ORDER — LAMOTRIGINE 100 MG/1
200 TABLET ORAL 2 TIMES DAILY
Qty: 120 TABLET | Refills: 11 | Status: SHIPPED | OUTPATIENT
Start: 2024-04-23

## 2024-04-23 ASSESSMENT — PAIN SCALES - GENERAL: PAINLEVEL: NO PAIN (0)

## 2024-04-23 NOTE — PROGRESS NOTES
[facility]:  RETURN VISIT          Service Date: 2024     HISTORY:  Mr. Donnell Staples is a 34-year-old man with lesional partial epilepsy.  He came alone to the visit today.      He denied having any seizures or adverse medication effects, following the most recent visit to this clinic on 2023.       Ictal semiology-history:   He had a single grand mal seizure, which occurred at clinical epilepsy onset in , when in college. He was under a lot of stress and had poor sleep. While he was falling asleep, he had a seizure. His brother found him unresponsive and jerking, with his bedding a mess and he was taken to the ER.     His seizures are characterized by right fifth finger shaking which he is able to feel before loss of consciousness. Then the shaking spreads through his whole body. He typically is lying in bed because they happen while he is trying to fall asleep. While he is just about to fall asleep. He had one while at his parents  house while standing and working at a computer. He has been tired and stressed prior. He became cyanotic. EMS was called. Seizures typically last 2-3 minutes (as an estimate.) His most recent seizure was on 02/10/2010; he has had a total of 10-12 seizures of this type. Many were unwitnessed.      He denies any aura symptoms like tastes, sounds, vision changes, smells. No association with alcohol. He reports the seizures will happen after a day when he has been able to sleep in a lot.      The patient does not recognize exacerbating or remitting factors with regard to seizure frequency.      Epilepsy-seizure predispositions:   The patient has no family history of epilepsy or seizures.    He has no history of gestational or  injury, febrile convulsions, developmental delay, stroke, meningitis, encephalitis, significant head injury, or other epileptic predispositions than the report cerebral malformation.       Laboratory  evaluations:   EEG on 5/6/2009 revealed occasional L frontotemporal slow activity with  occasional L temporal sharps.      Reports last MRI 2010, has small area of cortical malformation, we reviewed this on his phone       At [facility] on 11/18/2020, a brain 3T Tatum MRI was reported to show  cortical thinning of the posterior sylvian fissure and cingulate sulcus and adjacent to white matter volume loss and associated prominent subdural space , presumably on the left only.      Epilepsy therapeutics:   Keppra made him feel sleepy and mentally foggy.   Zonegran possibly had mild side effects, but mainly didn't work well for seizure control.  Lamotrigine has been well tolerated and effective.  He has been maintained on lamotrigine since 2010. He switched to generic 2019 due to insurance coverage, with no breakthrough seizures and no side effects.      PAST MEDICAL-SURGICAL HISTORY:    Lesional partial epilepsy.  Left perisylvian malformation.  Status post meniscus surgery.     FAMILY HISTORY:  There is no family history of seizures or epilepsy, or of other neurological conditions.       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  He lives with his wife in [place], [place].  He drinks alcohol rarely, denies tobacco use, denies recreational drugs.      REVIEW OF SYSTEMS:  The patient denied history of attention and memory disturbance, difficulties with comprehension and expression, difficulty in solving problems, weakness, tremors or other abnormal involuntary movements, numbness or tingling, incoordination, falling or difficulty in walking, urinary or fecal incontinence, headache and other pain, except as described above.  The patient denied any history of severe depression or suicidal ideation, severe anxiety, panic attacks, hallucinations, delusions, psychosis, substance abuse, or psychiatric hospitalization.  He denied rashes and easy bruising.  The remainder of a  14-system symptom review was negative.     MEDICATIONS:  Lamotrigine 200 mg b.i.d., and other medications as per the electronic medical record.      PHYSICAL EXAMINATION:    The patient was alert and in no apparent distress.  Vital signs were as per the electronic medical record.  Skull was normocephalic and atraumatic.  Neck was supple, without signs of meningeal irritation.       On neurological examination the patient appeared alert and was fully oriented to person, place, time, and reason for visit.  Speech showed normal articulation, fluency, repetitions, naming, syntax and comprehension.  Cranial nerves III through XII were normal.  Muscle masses, tones and strengths were normal throughout.  There was no pronator drift.  Sequential fine finger movements were performed normally with each hand.  No spontaneous tremors, myoclonus, or other abnormal movements were observed.  Sensations of light touch, pin prick, vibration and proprioception were reportedly normal throughout.  The rapid alternating movements, and finger-nose-finger and heel-shin maneuvers were performed normally bilaterally.  Romberg maneuver was negative.  Regular, heel, toe, tandem and reverse tandem walking were normal.  Deep tendon reflexes were normal and symmetric throughout.  Toes were downgoing bilaterally.       IMPRESSION:    The patient remains seizure-free on lamotrigine.  He has no apparent adverse effects.   A lamotrigine level was 6.2 mcg/ml on 06/13/2023.       We discussed the 3T MRI that was performed on 11/18/2020.  The cortical deformity is likely at the site of ictal onsets, and it not appear to have features of a neoplasm, according to the radiologist.       We again reviewed reasons that he should establish care with a primary care provider, for blood pressure monitoring and recommendations on weight loss.  He agreed with this.     He has not had a seizure with impaired awareness for many years, and has been driving under  [place] regulations.  We specifically reviewed [place] regulations on seizures and driving with the patient.  He appeared to clearly understand that he is prohibited from operating a motor vehicle within 3 months following any seizure or other episode with sudden unconsciousness or inability to sit up, and that he is required to report any future such seizure to the [facility] within 30 days after the event.       PLAN:    1)  Continue the current dose of lamotrigine.   2)  Return visit in about 10 months.      I spent 35 minutes in this patient care, with 21 minutes in direct patient contact, and 14 minutes in chart review and document preparation on the day of the visit.        Kevan Negrete M.D.   Professor of Neurology

## 2024-04-23 NOTE — NURSING NOTE
Chief Complaint   Patient presents with    RECHECK     /85 (BP Location: Left arm, Patient Position: Sitting, Cuff Size: Adult Large)   Pulse 71   Resp 16   SpO2 96%     EVERETT NANCY

## 2024-04-23 NOTE — LETTER
4/23/2024       RE: Donnell Staples  [address] [address] [address]     Dear Colleague,    Thank you for referring your patient, Donnell Staples, to the [facility] [place] at [facility]. Please see a copy of my visit note below.      [facility]:  RETURN VISIT          Service Date: 04/23/2024     HISTORY:  Mr. Donnell Staples is a 34-year-old man with lesional partial epilepsy.  He came alone to the visit today.      He denied having any seizures or adverse medication effects, following the most recent visit to this clinic on 06/13/2023.       Ictal semiology-history:   He had a single grand mal seizure, which occurred at clinical epilepsy onset in 2009, when in college. He was under a lot of stress and had poor sleep. While he was falling asleep, he had a seizure. His brother found him unresponsive and jerking, with his bedding a mess and he was taken to the ER.     His seizures are characterized by right fifth finger shaking which he is able to feel before loss of consciousness. Then the shaking spreads through his whole body. He typically is lying in bed because they happen while he is trying to fall asleep. While he is just about to fall asleep. He had one while at his parents  house while standing and working at a computer. He has been tired and stressed prior. He became cyanotic. EMS was called. Seizures typically last 2-3 minutes (as an estimate.) His most recent seizure was on 02/10/2010; he has had a total of 10-12 seizures of this type. Many were unwitnessed.      He denies any aura symptoms like tastes, sounds, vision changes, smells. No association with alcohol. He reports the seizures will happen after a day when he has been able to sleep in a lot.      The patient does not recognize exacerbating or remitting factors with regard to seizure frequency.      Epilepsy-seizure predispositions:    The patient has no family history of epilepsy or seizures.    He has no history of gestational or  injury, febrile convulsions, developmental delay, stroke, meningitis, encephalitis, significant head injury, or other epileptic predispositions than the report cerebral malformation.       Laboratory evaluations:   EEG on 2009 revealed occasional L frontotemporal slow activity with  occasional L temporal sharps.      Reports last MRI , has small area of cortical malformation, we reviewed this on his phone       At [facility] on 2020, a brain 3T Tatum MRI was reported to show  cortical thinning of the posterior sylvian fissure and cingulate sulcus and adjacent to white matter volume loss and associated prominent subdural space , presumably on the left only.      Epilepsy therapeutics:   Keppra made him feel sleepy and mentally foggy.   Zonegran possibly had mild side effects, but mainly didn't work well for seizure control.  Lamotrigine has been well tolerated and effective.  He has been maintained on lamotrigine since . He switched to generic 2019 due to insurance coverage, with no breakthrough seizures and no side effects.      PAST MEDICAL-SURGICAL HISTORY:    Lesional partial epilepsy.  Left perisylvian malformation.  Status post meniscus surgery.     FAMILY HISTORY:  There is no family history of seizures or epilepsy, or of other neurological conditions.       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  He lives with his wife in [place], [place].  He drinks alcohol rarely, denies tobacco use, denies recreational drugs.      REVIEW OF SYSTEMS:  The patient denied history of attention and memory disturbance, difficulties with comprehension and expression, difficulty in solving problems, weakness, tremors or other abnormal involuntary movements, numbness or tingling, incoordination, falling or difficulty in walking, urinary or fecal  incontinence, headache and other pain, except as described above.  The patient denied any history of severe depression or suicidal ideation, severe anxiety, panic attacks, hallucinations, delusions, psychosis, substance abuse, or psychiatric hospitalization.  He denied rashes and easy bruising.  The remainder of a 14-system symptom review was negative.     MEDICATIONS:  Lamotrigine 200 mg b.i.d., and other medications as per the electronic medical record.      PHYSICAL EXAMINATION:    The patient was alert and in no apparent distress.  Vital signs were as per the electronic medical record.  Skull was normocephalic and atraumatic.  Neck was supple, without signs of meningeal irritation.       On neurological examination the patient appeared alert and was fully oriented to person, place, time, and reason for visit.  Speech showed normal articulation, fluency, repetitions, naming, syntax and comprehension.  Cranial nerves III through XII were normal.  Muscle masses, tones and strengths were normal throughout.  There was no pronator drift.  Sequential fine finger movements were performed normally with each hand.  No spontaneous tremors, myoclonus, or other abnormal movements were observed.  Sensations of light touch, pin prick, vibration and proprioception were reportedly normal throughout.  The rapid alternating movements, and finger-nose-finger and heel-shin maneuvers were performed normally bilaterally.  Romberg maneuver was negative.  Regular, heel, toe, tandem and reverse tandem walking were normal.  Deep tendon reflexes were normal and symmetric throughout.  Toes were downgoing bilaterally.       IMPRESSION:    The patient remains seizure-free on lamotrigine.  He has no apparent adverse effects.   A lamotrigine level was 6.2 mcg/ml on 06/13/2023.       We discussed the 3T MRI that was performed on 11/18/2020.  The cortical deformity is likely at the site of ictal onsets, and it not appear to have features of a  neoplasm, according to the radiologist.       We again reviewed reasons that he should establish care with a primary care provider, for blood pressure monitoring and recommendations on weight loss.  He agreed with this.     He has not had a seizure with impaired awareness for many years, and has been driving under [place] regulations.  We specifically reviewed [place] regulations on seizures and driving with the patient.  He appeared to clearly understand that he is prohibited from operating a motor vehicle within 3 months following any seizure or other episode with sudden unconsciousness or inability to sit up, and that he is required to report any future such seizure to the [facility] within 30 days after the event.       PLAN:    1)  Continue the current dose of lamotrigine.   2)  Return visit in about 10 months.      I spent 35 minutes in this patient care, with 21 minutes in direct patient contact, and 14 minutes in chart review and document preparation on the day of the visit.            Again, thank you for allowing me to participate in the care of your patient.      Sincerely,    Kevan Negrete MD

## 2024-10-08 ENCOUNTER — PATIENT OUTREACH (OUTPATIENT)
Dept: CARE COORDINATION | Facility: CLINIC | Age: 34
End: 2024-10-08
Payer: COMMERCIAL

## 2024-10-22 ENCOUNTER — PATIENT OUTREACH (OUTPATIENT)
Dept: CARE COORDINATION | Facility: CLINIC | Age: 34
End: 2024-10-22
Payer: COMMERCIAL

## 2025-01-12 ENCOUNTER — HEALTH MAINTENANCE LETTER (OUTPATIENT)
Age: 35
End: 2025-01-12

## 2025-01-16 ENCOUNTER — PATIENT OUTREACH (OUTPATIENT)
Dept: CARE COORDINATION | Facility: CLINIC | Age: 35
End: 2025-01-16
Payer: COMMERCIAL

## 2025-02-19 ENCOUNTER — OFFICE VISIT (OUTPATIENT)
Dept: NEUROLOGY | Facility: CLINIC | Age: 35
End: 2025-02-19
Payer: COMMERCIAL

## 2025-02-19 VITALS
HEART RATE: 66 BPM | TEMPERATURE: 97.8 F | WEIGHT: 288.8 LBS | HEIGHT: 73 IN | OXYGEN SATURATION: 97 % | SYSTOLIC BLOOD PRESSURE: 147 MMHG | DIASTOLIC BLOOD PRESSURE: 87 MMHG | BODY MASS INDEX: 38.28 KG/M2

## 2025-02-19 DIAGNOSIS — G40.209 COMPLEX PARTIAL SEIZURE EVOLVING TO GENERALIZED SEIZURE (H): ICD-10-CM

## 2025-02-19 RX ORDER — LAMOTRIGINE 100 MG/1
200 TABLET ORAL 2 TIMES DAILY
Qty: 120 TABLET | Refills: 11 | Status: CANCELLED | OUTPATIENT
Start: 2025-02-19

## 2025-02-19 RX ORDER — LAMOTRIGINE 100 MG/1
200 TABLET ORAL 2 TIMES DAILY
Qty: 360 TABLET | Refills: 3 | Status: SHIPPED | OUTPATIENT
Start: 2025-02-19

## 2025-02-19 NOTE — LETTER
2025       RE: Donnell Staples  : 1990   MRN: [id number]      Dear Colleague,    Thank you for referring your patient, Donnell Staples, to the [facility] at [facility]. Please see a copy of my visit note below.       [facility]:  RETURN VISIT          Service Date: 2025     HISTORY:  Mr. Donnell Staples is a 35-year-old man with lesional partial epilepsy.  He came alone to the visit today.     No seizures since last visit.    Last seizure was in 2010, went to ER that time and was discharged without spending a night. He has been on Lamotrigine since  with good control.      He denied having any seizures or adverse medication effects, following the most recent visit to this clinic on 2023.  Tolerating lamotrigine without side effects.     Move to [place], [place] in 2021.      Ictal semiology-history:   He had a single grand mal seizure, which occurred at clinical epilepsy onset in , when in college. He was under a lot of stress and had poor sleep. While he was falling asleep, he had a seizure. His brother found him unresponsive and jerking, with his bedding a mess and he was taken to the ER.     His seizures are characterized by right fifth finger shaking which he is able to feel before loss of consciousness. Then the shaking spreads through his whole body. He typically is lying in bed because they happen while he is trying to fall asleep. While he is just about to fall asleep. He had one while at his parents  house while standing and working at a computer. He has been tired and stressed prior. He became cyanotic. EMS was called. Seizures typically last 2-3 minutes (as an estimate.) His most recent seizure was on 02/10/2010; he has had a total of 10-12 seizures of this type. Many were unwitnessed.      He denies any aura symptoms like tastes, sounds, vision changes, smells. No  association with alcohol. He reports the seizures will happen after a day when he has been able to sleep in a lot.      The patient does not recognize exacerbating or remitting factors with regard to seizure frequency.      Epilepsy-seizure predispositions:   The patient has no family history of epilepsy or seizures.    He has no history of gestational or  injury, febrile convulsions, developmental delay, stroke, meningitis, encephalitis, significant head injury, or other epileptic predispositions than the report cerebral malformation.       Laboratory evaluations:   EEG on 2009 revealed occasional L frontotemporal slow activity with  occasional L temporal sharps.      Reports last MRI , has small area of cortical malformation, we reviewed this on his phone       At [facility] on 2020, a brain 3T Tatum MRI was reported to show  cortical thinning of the posterior sylvian fissure and cingulate sulcus and adjacent to white matter volume loss and associated prominent subdural space , presumably on the left only.      Epilepsy therapeutics:   Keppra made him feel sleepy and mentally foggy.   Zonegran possibly had mild side effects, but mainly didn't work well for seizure control.  Lamotrigine has been well tolerated and effective.  He has been maintained on lamotrigine since . He switched to generic  due to insurance coverage, with no breakthrough seizures and no side effects.      PAST MEDICAL-SURGICAL HISTORY:    Lesional partial epilepsy.  Left perisylvian malformation.  Status post meniscus surgery.     FAMILY HISTORY:  There is no family history of seizures or epilepsy, or of other neurological conditions.       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  He used to with his wife in [place], [place]. Move to [place], [place] in . He drinks alcohol rarely, denies tobacco use, denies recreational drugs.      REVIEW OF SYSTEMS:  The  patient denied history of attention and memory disturbance, difficulties with comprehension and expression, difficulty in solving problems, weakness, tremors or other abnormal involuntary movements, numbness or tingling, incoordination, falling or difficulty in walking, urinary or fecal incontinence, headache and other pain, except as described above.  The patient denied any history of severe depression or suicidal ideation, severe anxiety, panic attacks, hallucinations, delusions, psychosis, substance abuse, or psychiatric hospitalization.  He denied rashes and easy bruising.  The remainder of a 14-system symptom review was negative.     MEDICATIONS:  Lamotrigine 200 mg b.i.d., and other medications as per the electronic medical record.      PHYSICAL EXAMINATION:    The patient was alert and in no apparent distress.  Vital signs were as per the electronic medical record.  Skull was normocephalic and atraumatic.  Neck was supple, without signs of meningeal irritation.       On neurological examination the patient appeared alert and was fully oriented to person, place, time, and reason for visit.  Speech showed normal articulation, fluency, repetitions, naming, syntax and comprehension.  Cranial nerves III through XII were normal.  Muscle masses, tones and strengths were normal throughout.  There was no pronator drift.  Sequential fine finger movements were performed normally with each hand.  No spontaneous tremors, myoclonus, or other abnormal movements were observed.  Sensations of light touch, pin prick, vibration and proprioception were reportedly normal throughout.  The rapid alternating movements, and finger-nose-finger and heel-shin maneuvers were performed normally bilaterally.  Romberg maneuver was negative.  Regular, heel, toe, tandem and reverse tandem walking were normal.  Deep tendon reflexes were normal and symmetric throughout.  Toes were downgoing bilaterally.       IMPRESSION:      #Lesional partial  epilepsy.  #Left perisylvian malformation.   Mr. Donnell Staples is a 34-year-old man with lesional partial epilepsy.    The patient remains seizure-free on lamotrigine.  He has no apparent adverse effects.   A lamotrigine level was 6.2 mcg/ml on 06/13/2023.       He had 3T MRI that was performed on 11/18/2020.  The cortical deformity is likely at the site of ictal onsets, and it not appear to have features of a neoplasm, according to the radiologist.       We again reviewed reasons that he should establish care with a primary care provider, for blood pressure monitoring and recommendations on weight loss.  He agreed with this.     We reviewed [place] regulations on seizures and driving with the patient.  He appeared to clearly understand that he is prohibited from operating a motor vehicle within 3 months following any seizure or other episode with sudden unconsciousness or inability to sit up, and that he is required to report any future such seizure to the [facility] within 30 days after the event.       PLAN:    1)  Continue the current dose of lamotrigine 200mg BID.   2)  Return visit in about 10 months.     Patient was seen and discussed with staff epileptologist, Dr. Negrete.     CORINE Serrato, MS  PGY3 Neurology     Report Prepared By: Jeremias Roman MD, Neurology Resident   I agree with the findings and plan of care as documented.  I personally examined the patient, and discussed our epilepsy diagnostic impressions and therapeutic recommendations with the patient.  The patient was agreeable to this plan.    I spent 21 minutes in this patient care, with 13 minutes in direct patient contact, and 8 minutes in chart review.   Kevan Negrete M.D., Professor of Neurology       Again, thank you for allowing me to participate in the care of your patient.      Sincerely,    Kevan Negrete MD

## 2025-02-19 NOTE — PROGRESS NOTES
[facility]:  RETURN VISIT          Service Date: 02/19/2025     HISTORY:  Mr. Donnell Staples is a 35-year-old man with lesional partial epilepsy.  He came alone to the visit today.     No seizures since last visit.    Last seizure was in Feb 2010, went to ER that time and was discharged without spending a night. He has been on Lamotrigine since 2010 with good control.      He denied having any seizures or adverse medication effects, following the most recent visit to this clinic on 06/13/2023.  Tolerating lamotrigine without side effects.     Move to [place], [place] in July 2021.      Ictal semiology-history:   He had a single grand mal seizure, which occurred at clinical epilepsy onset in 2009, when in college. He was under a lot of stress and had poor sleep. While he was falling asleep, he had a seizure. His brother found him unresponsive and jerking, with his bedding a mess and he was taken to the ER.     His seizures are characterized by right fifth finger shaking which he is able to feel before loss of consciousness. Then the shaking spreads through his whole body. He typically is lying in bed because they happen while he is trying to fall asleep. While he is just about to fall asleep. He had one while at his parents  house while standing and working at a computer. He has been tired and stressed prior. He became cyanotic. EMS was called. Seizures typically last 2-3 minutes (as an estimate.) His most recent seizure was on 02/10/2010; he has had a total of 10-12 seizures of this type. Many were unwitnessed.      He denies any aura symptoms like tastes, sounds, vision changes, smells. No association with alcohol. He reports the seizures will happen after a day when he has been able to sleep in a lot.      The patient does not recognize exacerbating or remitting factors with regard to seizure frequency.      Epilepsy-seizure predispositions:   The patient has no family history of epilepsy  or seizures.    He has no history of gestational or  injury, febrile convulsions, developmental delay, stroke, meningitis, encephalitis, significant head injury, or other epileptic predispositions than the report cerebral malformation.       Laboratory evaluations:   EEG on 2009 revealed occasional L frontotemporal slow activity with  occasional L temporal sharps.      Reports last MRI , has small area of cortical malformation, we reviewed this on his phone       At [facility] on 2020, a brain 3T T[place] MRI was reported to show  cortical thinning of the posterior sylvian fissure and cingulate sulcus and adjacent to white matter volume loss and associated prominent subdural space , presumably on the left only.      Epilepsy therapeutics:   Keppra made him feel sleepy and mentally foggy.   Zonegran possibly had mild side effects, but mainly didn't work well for seizure control.  Lamotrigine has been well tolerated and effective.  He has been maintained on lamotrigine since . He switched to generic 2019 due to insurance coverage, with no breakthrough seizures and no side effects.      PAST MEDICAL-SURGICAL HISTORY:    Lesional partial epilepsy.  Left perisylvian malformation.  Status post meniscus surgery.     FAMILY HISTORY:  There is no family history of seizures or epilepsy, or of other neurological conditions.       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  He used to with his wife in [place], [place]. Move to [place], [place] in . He drinks alcohol rarely, denies tobacco use, denies recreational drugs.      REVIEW OF SYSTEMS:  The patient denied history of attention and memory disturbance, difficulties with comprehension and expression, difficulty in solving problems, weakness, tremors or other abnormal involuntary movements, numbness or tingling, incoordination, falling or difficulty in walking, urinary or fecal incontinence, headache  and other pain, except as described above.  The patient denied any history of severe depression or suicidal ideation, severe anxiety, panic attacks, hallucinations, delusions, psychosis, substance abuse, or psychiatric hospitalization.  He denied rashes and easy bruising.  The remainder of a 14-system symptom review was negative.     MEDICATIONS:  Lamotrigine 200 mg b.i.d., and other medications as per the electronic medical record.      PHYSICAL EXAMINATION:    The patient was alert and in no apparent distress.  Vital signs were as per the electronic medical record.  Skull was normocephalic and atraumatic.  Neck was supple, without signs of meningeal irritation.       On neurological examination the patient appeared alert and was fully oriented to person, place, time, and reason for visit.  Speech showed normal articulation, fluency, repetitions, naming, syntax and comprehension.  Cranial nerves III through XII were normal.  Muscle masses, tones and strengths were normal throughout.  There was no pronator drift.  Sequential fine finger movements were performed normally with each hand.  No spontaneous tremors, myoclonus, or other abnormal movements were observed.  Sensations of light touch, pin prick, vibration and proprioception were reportedly normal throughout.  The rapid alternating movements, and finger-nose-finger and heel-shin maneuvers were performed normally bilaterally.  Romberg maneuver was negative.  Regular, heel, toe, tandem and reverse tandem walking were normal.  Deep tendon reflexes were normal and symmetric throughout.  Toes were downgoing bilaterally.       IMPRESSION:      #Lesional partial epilepsy.  #Left perisylvian malformation.   Mr. Donnell Staples is a 34-year-old man with lesional partial epilepsy.    The patient remains seizure-free on lamotrigine.  He has no apparent adverse effects.   A lamotrigine level was 6.2 mcg/ml on 06/13/2023.       He had 3T MRI that was performed on 11/18/2020.   The cortical deformity is likely at the site of ictal onsets, and it not appear to have features of a neoplasm, according to the radiologist.       We again reviewed reasons that he should establish care with a primary care provider, for blood pressure monitoring and recommendations on weight loss.  He agreed with this.     We reviewed [place] regulations on seizures and driving with the patient.  He appeared to clearly understand that he is prohibited from operating a motor vehicle within 3 months following any seizure or other episode with sudden unconsciousness or inability to sit up, and that he is required to report any future such seizure to the [facility] within 30 days after the event.       PLAN:    1)  Continue the current dose of lamotrigine 200mg BID.   2)  Return visit in about 10 months.     Patient was seen and discussed with staff epileptologist, Dr. Negrete.     CORINE Serrato, MS  PGY3 Neurology     Report Prepared By: Jeremias Roman MD, Neurology Resident   I agree with the findings and plan of care as documented.  I personally examined the patient, and discussed our epilepsy diagnostic impressions and therapeutic recommendations with the patient.  The patient was agreeable to this plan.    I spent 21 minutes in this patient care, with 13 minutes in direct patient contact, and 8 minutes in chart review.   Kevan Negrete M.D., Professor of Neurology

## 2025-05-06 ENCOUNTER — PATIENT OUTREACH (OUTPATIENT)
Dept: CARE COORDINATION | Facility: CLINIC | Age: 35
End: 2025-05-06